# Patient Record
Sex: MALE | Race: BLACK OR AFRICAN AMERICAN | Employment: FULL TIME | URBAN - METROPOLITAN AREA
[De-identification: names, ages, dates, MRNs, and addresses within clinical notes are randomized per-mention and may not be internally consistent; named-entity substitution may affect disease eponyms.]

---

## 2019-04-14 ENCOUNTER — HOSPITAL ENCOUNTER (EMERGENCY)
Age: 29
Discharge: HOME | End: 2019-04-14
Attending: EMERGENCY MEDICINE
Payer: COMMERCIAL

## 2019-04-14 VITALS
RESPIRATION RATE: 16 BRPM | DIASTOLIC BLOOD PRESSURE: 93 MMHG | OXYGEN SATURATION: 99 % | HEART RATE: 82 BPM | HEIGHT: 73 IN | WEIGHT: 206.35 LBS | TEMPERATURE: 97.9 F | SYSTOLIC BLOOD PRESSURE: 137 MMHG | BODY MASS INDEX: 27.35 KG/M2

## 2019-04-14 DIAGNOSIS — S09.90XA CLOSED HEAD INJURY, INITIAL ENCOUNTER: ICD-10-CM

## 2019-04-14 DIAGNOSIS — S01.01XA LACERATION OF SCALP, INITIAL ENCOUNTER: Primary | ICD-10-CM

## 2019-04-14 PROCEDURE — 99283 EMERGENCY DEPT VISIT LOW MDM: CPT

## 2019-04-14 PROCEDURE — 4500000023 HC ED LEVEL 3 PROCEDURE

## 2019-04-14 ASSESSMENT — PAIN DESCRIPTION - ONSET: ONSET: SUDDEN

## 2019-04-14 ASSESSMENT — PAIN DESCRIPTION - FREQUENCY: FREQUENCY: CONTINUOUS

## 2019-04-14 ASSESSMENT — PAIN DESCRIPTION - DESCRIPTORS: DESCRIPTORS: SHARP

## 2019-04-14 ASSESSMENT — PAIN SCALES - GENERAL
PAINLEVEL_OUTOF10: 6
PAINLEVEL_OUTOF10: 0
PAINLEVEL_OUTOF10: 0

## 2019-04-14 ASSESSMENT — PAIN DESCRIPTION - LOCATION: LOCATION: HEAD

## 2019-04-14 ASSESSMENT — PAIN - FUNCTIONAL ASSESSMENT
PAIN_FUNCTIONAL_ASSESSMENT: ACTIVITIES ARE NOT PREVENTED
PAIN_FUNCTIONAL_ASSESSMENT: ACTIVITIES ARE NOT PREVENTED

## 2019-04-14 ASSESSMENT — PAIN DESCRIPTION - PROGRESSION
CLINICAL_PROGRESSION: NOT CHANGED
CLINICAL_PROGRESSION: RESOLVED
CLINICAL_PROGRESSION: RESOLVED

## 2019-04-14 ASSESSMENT — PAIN DESCRIPTION - PAIN TYPE: TYPE: ACUTE PAIN
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
## 2019-04-14 NOTE — ED NOTES
D/C instructions, including follow up care given to pt. Pt verbalized understanding and denies further questions or needs at this time. Ambulatory to waiting room with steady gait. TRINY.         Mirella Don RN  04/14/19 7576

## 2019-04-14 NOTE — DISCHARGE INSTR - COC
{YES / NJ:63672}  Urinary Catheter: {Urinary Catheter:821057218}   Colostomy/Ileostomy/Ileal Conduit: {YES / JI:68385}       Date of Last BM: ***  No intake or output data in the 24 hours ending 19 0813  No intake/output data recorded.     Safety Concerns:     [address] [facility] Safety Concerns:528534641}    Impairments/Disabilities:      [address] [facility] Impairments/Disabilities:533351961}    Nutrition Therapy:  Current Nutrition Therapy:   [address] Gle[facility] Diet List:869667101}    Routes of Feeding: {CHP DME Other Feedings:942671461}  Liquids: {Slp liquid thickness:71508}  Daily Fluid Restriction: {CHP DME Yes amt example:782024100}  Last Modified Barium Swallow with Video (Video Swallowing Test): {Done Not Done VXHY:720044856}    Treatments at the Time of Hospital Discharge:   Respiratory Treatments: ***  Oxygen Therapy:  {Therapy; copd oxygen:93387}  Ventilator:    { CC Vent YJBF:542228185}    Rehab Therapies: {THERAPEUTIC INTERVENTION:1629009545}  Weight Bearing Status/Restrictions: [address] [facility] Weight Bearin}  Other Medical Equipment (for information only, NOT a DME order):  {EQUIPMENT:918500815}  Other Treatments: ***    Patient's personal belongings (please select all that are sent with patient):  {[facility] DME Belongings:913539224}    RN SIGNATURE:  {Esignature:586257378}    CASE MANAGEMENT/SOCIAL WORK SECTION    Inpatient Status Date: ***    Readmission Risk Assessment Score:  Readmission Risk              Risk of Unplanned Readmission:        0           Discharging to Facility/ Agency   · Name:   · Address:  · Phone:  · Fax:    Dialysis Facility (if applicable)   · Name:  · Address:  · Dialysis Schedule:  · Phone:  · Fax:    / signature: {Esignature:823072870}    PHYSICIAN SECTION    Prognosis: {Prognosis:3357945905}    Condition at Discharge: [address] Gleason Patient Condition:637230218}    Rehab Potential (if transferring to Rehab): {Prognosis:4950329873}    Recommended Labs or Other Treatments After Discharge: ***    Physician Certification: I certify the above information and transfer of Sameer Valdivia  is necessary for the continuing treatment of the diagnosis listed and that he requires {Admit to Appropriate Level of Care:73616} for {GREATER/LESS:029022983} 30 days.      Update Admission H&P: {CHP DME Changes in GUFYP:942117484}    PHYSICIAN SIGNATURE:  {Esignature:040225049}

## 2019-04-14 NOTE — ED NOTES
Wound care, localization, and closure completed per Dr. Abad Dawson. Pt tolerated procedure without adverse effect.       Freddie Edgar RN  04/14/19 5685

## 2019-11-30 ENCOUNTER — APPOINTMENT (OUTPATIENT)
Dept: GENERAL RADIOLOGY | Age: 29
End: 2019-11-30
Payer: COMMERCIAL

## 2019-11-30 ENCOUNTER — APPOINTMENT (OUTPATIENT)
Dept: CT IMAGING | Age: 29
End: 2019-11-30
Payer: COMMERCIAL

## 2019-11-30 ENCOUNTER — HOSPITAL ENCOUNTER (EMERGENCY)
Age: 29
Discharge: HOME | End: 2019-11-30
Attending: EMERGENCY MEDICINE
Payer: COMMERCIAL

## 2019-11-30 VITALS
DIASTOLIC BLOOD PRESSURE: 83 MMHG | WEIGHT: 212.3 LBS | TEMPERATURE: 98.2 F | OXYGEN SATURATION: 100 % | RESPIRATION RATE: 15 BRPM | SYSTOLIC BLOOD PRESSURE: 138 MMHG | BODY MASS INDEX: 28.01 KG/M2 | HEART RATE: 67 BPM

## 2019-11-30 DIAGNOSIS — S42.401A CLOSED FRACTURE DISLOCATION OF RIGHT ELBOW, INITIAL ENCOUNTER: Primary | ICD-10-CM

## 2019-11-30 PROCEDURE — 73080 X-RAY EXAM OF ELBOW: CPT

## 2019-11-30 PROCEDURE — 99284 EMERGENCY DEPT VISIT MOD MDM: CPT

## 2019-11-30 PROCEDURE — 73070 X-RAY EXAM OF ELBOW: CPT

## 2019-11-30 PROCEDURE — 6370000000 HC RX 637 (ALT 250 FOR IP): Performed by: NURSE PRACTITIONER

## 2019-11-30 PROCEDURE — 94761 N-INVAS EAR/PLS OXIMETRY MLT: CPT

## 2019-11-30 PROCEDURE — 96374 THER/PROPH/DIAG INJ IV PUSH: CPT

## 2019-11-30 PROCEDURE — 96376 TX/PRO/DX INJ SAME DRUG ADON: CPT

## 2019-11-30 PROCEDURE — 4500000024 HC ED LEVEL 4 PROCEDURE

## 2019-11-30 PROCEDURE — 2500000003 HC RX 250 WO HCPCS: Performed by: NURSE PRACTITIONER

## 2019-11-30 PROCEDURE — 94770 HC ETCO2 MONITOR DAILY: CPT

## 2019-11-30 PROCEDURE — 73200 CT UPPER EXTREMITY W/O DYE: CPT

## 2019-11-30 PROCEDURE — 2580000003 HC RX 258: Performed by: NURSE PRACTITIONER

## 2019-11-30 PROCEDURE — 2700000000 HC OXYGEN THERAPY PER DAY

## 2019-11-30 PROCEDURE — 96375 TX/PRO/DX INJ NEW DRUG ADDON: CPT

## 2019-11-30 PROCEDURE — 6360000002 HC RX W HCPCS: Performed by: NURSE PRACTITIONER

## 2019-11-30 RX ORDER — OXYCODONE HYDROCHLORIDE AND ACETAMINOPHEN 5; 325 MG/1; MG/1
1 TABLET ORAL ONCE
Status: COMPLETED | OUTPATIENT
Start: 2019-11-30 | End: 2019-11-30

## 2019-11-30 RX ORDER — OXYCODONE HYDROCHLORIDE AND ACETAMINOPHEN 5; 325 MG/1; MG/1
1 TABLET ORAL EVERY 6 HOURS PRN
Qty: 15 TABLET | Refills: 0 | Status: SHIPPED | OUTPATIENT
Start: 2019-11-30 | End: 2019-12-04

## 2019-11-30 RX ORDER — IBUPROFEN 800 MG/1
800 TABLET ORAL EVERY 8 HOURS PRN
Qty: 30 TABLET | Refills: 0 | Status: SHIPPED | OUTPATIENT
Start: 2019-11-30 | End: 2020-05-12 | Stop reason: ALTCHOICE

## 2019-11-30 RX ORDER — ETOMIDATE 2 MG/ML
15 INJECTION INTRAVENOUS ONCE
Status: COMPLETED | OUTPATIENT
Start: 2019-11-30 | End: 2019-11-30

## 2019-11-30 RX ORDER — 0.9 % SODIUM CHLORIDE 0.9 %
1000 INTRAVENOUS SOLUTION INTRAVENOUS ONCE
Status: COMPLETED | OUTPATIENT
Start: 2019-11-30 | End: 2019-11-30

## 2019-11-30 RX ORDER — ONDANSETRON 2 MG/ML
4 INJECTION INTRAMUSCULAR; INTRAVENOUS ONCE
Status: COMPLETED | OUTPATIENT
Start: 2019-11-30 | End: 2019-11-30

## 2019-11-30 RX ADMIN — HYDROMORPHONE HYDROCHLORIDE 1 MG: 1 INJECTION, SOLUTION INTRAMUSCULAR; INTRAVENOUS; SUBCUTANEOUS at 02:16

## 2019-11-30 RX ADMIN — ONDANSETRON 4 MG: 2 INJECTION INTRAMUSCULAR; INTRAVENOUS at 03:50

## 2019-11-30 RX ADMIN — HYDROMORPHONE HYDROCHLORIDE 1 MG: 1 INJECTION, SOLUTION INTRAMUSCULAR; INTRAVENOUS; SUBCUTANEOUS at 01:28

## 2019-11-30 RX ADMIN — SODIUM CHLORIDE 1000 ML: 9 INJECTION, SOLUTION INTRAVENOUS at 01:57

## 2019-11-30 RX ADMIN — ETOMIDATE 15 MG: 2 INJECTION INTRAVENOUS at 02:00

## 2019-11-30 RX ADMIN — HYDROMORPHONE HYDROCHLORIDE 1 MG: 1 INJECTION, SOLUTION INTRAMUSCULAR; INTRAVENOUS; SUBCUTANEOUS at 03:21

## 2019-11-30 RX ADMIN — OXYCODONE HYDROCHLORIDE AND ACETAMINOPHEN 1 TABLET: 5; 325 TABLET ORAL at 03:21

## 2019-11-30 ASSESSMENT — PAIN SCALES - GENERAL
PAINLEVEL_OUTOF10: 10
PAINLEVEL_OUTOF10: 6
PAINLEVEL_OUTOF10: 10

## 2019-11-30 ASSESSMENT — ENCOUNTER SYMPTOMS
VOMITING: 0
NAUSEA: 0
ABDOMINAL PAIN: 0
COLOR CHANGE: 0
SHORTNESS OF BREATH: 0
ABDOMINAL DISTENTION: 0
BACK PAIN: 0
FACIAL SWELLING: 0

## 2019-11-30 ASSESSMENT — PAIN - FUNCTIONAL ASSESSMENT: PAIN_FUNCTIONAL_ASSESSMENT: ACTIVITIES ARE NOT PREVENTED

## 2019-11-30 ASSESSMENT — PAIN DESCRIPTION - ORIENTATION: ORIENTATION: RIGHT

## 2019-11-30 ASSESSMENT — PAIN DESCRIPTION - PAIN TYPE: TYPE: ACUTE PAIN

## 2019-11-30 ASSESSMENT — PAIN DESCRIPTION - LOCATION: LOCATION: ELBOW

## 2019-11-30 ASSESSMENT — PAIN DESCRIPTION - DESCRIPTORS: DESCRIPTORS: CONSTANT

## 2019-11-30 ASSESSMENT — PAIN DESCRIPTION - FREQUENCY: FREQUENCY: CONTINUOUS

## 2019-11-30 ASSESSMENT — PAIN DESCRIPTION - ONSET: ONSET: SUDDEN

## 2019-12-02 ENCOUNTER — TELEPHONE (OUTPATIENT)
Dept: ORTHOPEDIC SURGERY | Age: 29
End: 2019-12-02

## 2019-12-04 ENCOUNTER — OFFICE VISIT (OUTPATIENT)
Dept: ORTHOPEDIC SURGERY | Age: 29
End: 2019-12-04
Payer: COMMERCIAL

## 2019-12-04 VITALS — BODY MASS INDEX: 28.71 KG/M2 | HEIGHT: 72 IN | WEIGHT: 212 LBS | RESPIRATION RATE: 16 BRPM

## 2019-12-04 DIAGNOSIS — S53.104A DISLOCATION OF RIGHT ELBOW, INITIAL ENCOUNTER: Primary | ICD-10-CM

## 2019-12-04 DIAGNOSIS — S52.041A DISPLACED FRACTURE OF CORONOID PROCESS OF RIGHT ULNA, INITIAL ENCOUNTER FOR CLOSED FRACTURE: ICD-10-CM

## 2019-12-04 DIAGNOSIS — S52.134A CLOSED NONDISPLACED FRACTURE OF NECK OF RIGHT RADIUS, INITIAL ENCOUNTER: ICD-10-CM

## 2019-12-04 PROCEDURE — 24670 CLTX ULNAR FX PROX W/O MNPJ: CPT | Performed by: ORTHOPAEDIC SURGERY

## 2019-12-04 PROCEDURE — 24650 CLTX RDL HEAD/NCK FX WO MNPJ: CPT | Performed by: ORTHOPAEDIC SURGERY

## 2019-12-04 PROCEDURE — 99203 OFFICE O/P NEW LOW 30 MIN: CPT | Performed by: ORTHOPAEDIC SURGERY

## 2019-12-18 ENCOUNTER — OFFICE VISIT (OUTPATIENT)
Dept: ORTHOPEDIC SURGERY | Age: 29
End: 2019-12-18

## 2019-12-18 VITALS — BODY MASS INDEX: 28.71 KG/M2 | HEIGHT: 72 IN | WEIGHT: 212 LBS

## 2019-12-18 DIAGNOSIS — S52.134A CLOSED NONDISPLACED FRACTURE OF NECK OF RIGHT RADIUS, INITIAL ENCOUNTER: ICD-10-CM

## 2019-12-18 DIAGNOSIS — S52.041A DISPLACED FRACTURE OF CORONOID PROCESS OF RIGHT ULNA, INITIAL ENCOUNTER FOR CLOSED FRACTURE: ICD-10-CM

## 2019-12-18 DIAGNOSIS — S53.104A DISLOCATION OF RIGHT ELBOW, INITIAL ENCOUNTER: Primary | ICD-10-CM

## 2019-12-18 PROCEDURE — 99024 POSTOP FOLLOW-UP VISIT: CPT | Performed by: ORTHOPAEDIC SURGERY

## 2019-12-23 ENCOUNTER — TELEPHONE (OUTPATIENT)
Dept: ORTHOPEDIC SURGERY | Age: 29
End: 2019-12-23

## 2019-12-23 ENCOUNTER — OFFICE VISIT (OUTPATIENT)
Dept: ORTHOPEDIC SURGERY | Age: 29
End: 2019-12-23
Payer: COMMERCIAL

## 2019-12-23 VITALS — BODY MASS INDEX: 28.73 KG/M2 | WEIGHT: 212.08 LBS | HEIGHT: 72 IN

## 2019-12-23 DIAGNOSIS — S53.104A DISLOCATION OF RIGHT ELBOW, INITIAL ENCOUNTER: Primary | ICD-10-CM

## 2019-12-23 DIAGNOSIS — S52.041A DISPLACED FRACTURE OF CORONOID PROCESS OF RIGHT ULNA, INITIAL ENCOUNTER FOR CLOSED FRACTURE: ICD-10-CM

## 2019-12-23 DIAGNOSIS — S52.134A CLOSED NONDISPLACED FRACTURE OF NECK OF RIGHT RADIUS, INITIAL ENCOUNTER: ICD-10-CM

## 2019-12-23 PROCEDURE — 99203 OFFICE O/P NEW LOW 30 MIN: CPT | Performed by: ORTHOPAEDIC SURGERY

## 2019-12-24 ENCOUNTER — ANESTHESIA EVENT (OUTPATIENT)
Dept: OPERATING ROOM | Age: 29
End: 2019-12-24
Payer: COMMERCIAL

## 2019-12-26 ENCOUNTER — HOSPITAL ENCOUNTER (OUTPATIENT)
Age: 29
Setting detail: OUTPATIENT SURGERY
Discharge: HOME | End: 2019-12-26
Attending: ORTHOPAEDIC SURGERY | Admitting: ORTHOPAEDIC SURGERY
Payer: COMMERCIAL

## 2019-12-26 ENCOUNTER — ANESTHESIA (OUTPATIENT)
Dept: OPERATING ROOM | Age: 29
End: 2019-12-26
Payer: COMMERCIAL

## 2019-12-26 ENCOUNTER — APPOINTMENT (OUTPATIENT)
Dept: GENERAL RADIOLOGY | Age: 29
End: 2019-12-26
Attending: ORTHOPAEDIC SURGERY
Payer: COMMERCIAL

## 2019-12-26 VITALS — SYSTOLIC BLOOD PRESSURE: 98 MMHG | DIASTOLIC BLOOD PRESSURE: 50 MMHG | OXYGEN SATURATION: 98 % | TEMPERATURE: 98.6 F

## 2019-12-26 VITALS
WEIGHT: 200 LBS | BODY MASS INDEX: 26.51 KG/M2 | HEART RATE: 91 BPM | HEIGHT: 73 IN | RESPIRATION RATE: 20 BRPM | TEMPERATURE: 97.9 F | DIASTOLIC BLOOD PRESSURE: 82 MMHG | OXYGEN SATURATION: 96 % | SYSTOLIC BLOOD PRESSURE: 135 MMHG

## 2019-12-26 DIAGNOSIS — S53.104A DISLOCATION OF RIGHT ELBOW, INITIAL ENCOUNTER: ICD-10-CM

## 2019-12-26 DIAGNOSIS — S52.041A DISPLACED FRACTURE OF CORONOID PROCESS OF RIGHT ULNA, INITIAL ENCOUNTER FOR CLOSED FRACTURE: Primary | ICD-10-CM

## 2019-12-26 PROCEDURE — 2580000003 HC RX 258: Performed by: ORTHOPAEDIC SURGERY

## 2019-12-26 PROCEDURE — 6360000002 HC RX W HCPCS: Performed by: NURSE ANESTHETIST, CERTIFIED REGISTERED

## 2019-12-26 PROCEDURE — 2720000010 HC SURG SUPPLY STERILE: Performed by: ORTHOPAEDIC SURGERY

## 2019-12-26 PROCEDURE — 64415 NJX AA&/STRD BRCH PLXS IMG: CPT | Performed by: ANESTHESIOLOGY

## 2019-12-26 PROCEDURE — 7100000000 HC PACU RECOVERY - FIRST 15 MIN: Performed by: ORTHOPAEDIC SURGERY

## 2019-12-26 PROCEDURE — 7100000010 HC PHASE II RECOVERY - FIRST 15 MIN: Performed by: ORTHOPAEDIC SURGERY

## 2019-12-26 PROCEDURE — 2500000003 HC RX 250 WO HCPCS: Performed by: ANESTHESIOLOGY

## 2019-12-26 PROCEDURE — 3700000001 HC ADD 15 MINUTES (ANESTHESIA): Performed by: ORTHOPAEDIC SURGERY

## 2019-12-26 PROCEDURE — 3600000004 HC SURGERY LEVEL 4 BASE: Performed by: ORTHOPAEDIC SURGERY

## 2019-12-26 PROCEDURE — 2580000003 HC RX 258: Performed by: ANESTHESIOLOGY

## 2019-12-26 PROCEDURE — 6360000002 HC RX W HCPCS

## 2019-12-26 PROCEDURE — 2500000003 HC RX 250 WO HCPCS: Performed by: NURSE ANESTHETIST, CERTIFIED REGISTERED

## 2019-12-26 PROCEDURE — 6370000000 HC RX 637 (ALT 250 FOR IP): Performed by: ORTHOPAEDIC SURGERY

## 2019-12-26 PROCEDURE — 3209999900 FLUORO FOR SURGICAL PROCEDURES

## 2019-12-26 PROCEDURE — 3600000014 HC SURGERY LEVEL 4 ADDTL 15MIN: Performed by: ORTHOPAEDIC SURGERY

## 2019-12-26 PROCEDURE — 7100000011 HC PHASE II RECOVERY - ADDTL 15 MIN: Performed by: ORTHOPAEDIC SURGERY

## 2019-12-26 PROCEDURE — 6360000002 HC RX W HCPCS: Performed by: ORTHOPAEDIC SURGERY

## 2019-12-26 PROCEDURE — 7100000001 HC PACU RECOVERY - ADDTL 15 MIN: Performed by: ORTHOPAEDIC SURGERY

## 2019-12-26 PROCEDURE — C1713 ANCHOR/SCREW BN/BN,TIS/BN: HCPCS | Performed by: ORTHOPAEDIC SURGERY

## 2019-12-26 PROCEDURE — 2709999900 HC NON-CHARGEABLE SUPPLY: Performed by: ORTHOPAEDIC SURGERY

## 2019-12-26 PROCEDURE — 3700000000 HC ANESTHESIA ATTENDED CARE: Performed by: ORTHOPAEDIC SURGERY

## 2019-12-26 PROCEDURE — 6360000002 HC RX W HCPCS: Performed by: ANESTHESIOLOGY

## 2019-12-26 PROCEDURE — 73070 X-RAY EXAM OF ELBOW: CPT

## 2019-12-26 DEVICE — PIN FIX L45MM DIA2.5MM ELBW CO CHROM AXIS FOR STBL SYS IJS: Type: IMPLANTABLE DEVICE | Site: ELBOW | Status: FUNCTIONAL

## 2019-12-26 DEVICE — SCREW BNE L16MM DIA3.5MM CORT DST RAD VOLAR TI NONLOCKING: Type: IMPLANTABLE DEVICE | Site: ELBOW | Status: FUNCTIONAL

## 2019-12-26 DEVICE — IMPLANTABLE DEVICE: Type: IMPLANTABLE DEVICE | Site: ELBOW | Status: FUNCTIONAL

## 2019-12-26 DEVICE — SCREW BNE L18MM DIA3.5MM CORT DST RAD VOLAR TI NONLOCKING: Type: IMPLANTABLE DEVICE | Site: ELBOW | Status: FUNCTIONAL

## 2019-12-26 DEVICE — BASEPLATE BNE EL TI ASSEMB FOR STBL SYS IJS: Type: IMPLANTABLE DEVICE | Site: ELBOW | Status: FUNCTIONAL

## 2019-12-26 DEVICE — SCREW BNE L40MM DIA3.5MM CORT S STL ST NONCANNULATED LOK: Type: IMPLANTABLE DEVICE | Site: ELBOW | Status: FUNCTIONAL

## 2019-12-26 RX ORDER — OXYCODONE HYDROCHLORIDE AND ACETAMINOPHEN 5; 325 MG/1; MG/1
1 TABLET ORAL PRN
Status: DISCONTINUED | OUTPATIENT
Start: 2019-12-26 | End: 2019-12-26 | Stop reason: HOSPADM

## 2019-12-26 RX ORDER — ONDANSETRON 2 MG/ML
INJECTION INTRAMUSCULAR; INTRAVENOUS PRN
Status: DISCONTINUED | OUTPATIENT
Start: 2019-12-26 | End: 2019-12-26 | Stop reason: SDUPTHER

## 2019-12-26 RX ORDER — ENALAPRILAT 2.5 MG/2ML
1.25 INJECTION INTRAVENOUS
Status: DISCONTINUED | OUTPATIENT
Start: 2019-12-26 | End: 2019-12-26 | Stop reason: HOSPADM

## 2019-12-26 RX ORDER — ONDANSETRON 2 MG/ML
4 INJECTION INTRAMUSCULAR; INTRAVENOUS ONCE
Status: COMPLETED | OUTPATIENT
Start: 2019-12-26 | End: 2019-12-26

## 2019-12-26 RX ORDER — SODIUM CHLORIDE, SODIUM LACTATE, POTASSIUM CHLORIDE, CALCIUM CHLORIDE 600; 310; 30; 20 MG/100ML; MG/100ML; MG/100ML; MG/100ML
INJECTION, SOLUTION INTRAVENOUS CONTINUOUS
Status: DISCONTINUED | OUTPATIENT
Start: 2019-12-26 | End: 2019-12-26 | Stop reason: HOSPADM

## 2019-12-26 RX ORDER — LABETALOL 20 MG/4 ML (5 MG/ML) INTRAVENOUS SYRINGE
5 EVERY 10 MIN PRN
Status: DISCONTINUED | OUTPATIENT
Start: 2019-12-26 | End: 2019-12-26 | Stop reason: HOSPADM

## 2019-12-26 RX ORDER — HYDRALAZINE HYDROCHLORIDE 20 MG/ML
5 INJECTION INTRAMUSCULAR; INTRAVENOUS EVERY 5 MIN PRN
Status: DISCONTINUED | OUTPATIENT
Start: 2019-12-26 | End: 2019-12-26 | Stop reason: HOSPADM

## 2019-12-26 RX ORDER — LIDOCAINE HYDROCHLORIDE 10 MG/ML
1 INJECTION, SOLUTION EPIDURAL; INFILTRATION; INTRACAUDAL; PERINEURAL
Status: DISCONTINUED | OUTPATIENT
Start: 2019-12-26 | End: 2019-12-26 | Stop reason: HOSPADM

## 2019-12-26 RX ORDER — MIDAZOLAM HYDROCHLORIDE 1 MG/ML
2 INJECTION INTRAMUSCULAR; INTRAVENOUS
Status: COMPLETED | OUTPATIENT
Start: 2019-12-26 | End: 2019-12-26

## 2019-12-26 RX ORDER — FENTANYL CITRATE 50 UG/ML
50 INJECTION, SOLUTION INTRAMUSCULAR; INTRAVENOUS EVERY 5 MIN PRN
Status: DISCONTINUED | OUTPATIENT
Start: 2019-12-26 | End: 2019-12-26 | Stop reason: HOSPADM

## 2019-12-26 RX ORDER — DEXAMETHASONE SODIUM PHOSPHATE 4 MG/ML
INJECTION, SOLUTION INTRA-ARTICULAR; INTRALESIONAL; INTRAMUSCULAR; INTRAVENOUS; SOFT TISSUE
Status: COMPLETED
Start: 2019-12-26 | End: 2019-12-26

## 2019-12-26 RX ORDER — SODIUM CHLORIDE 0.9 % (FLUSH) 0.9 %
10 SYRINGE (ML) INJECTION EVERY 12 HOURS SCHEDULED
Status: DISCONTINUED | OUTPATIENT
Start: 2019-12-26 | End: 2019-12-26 | Stop reason: HOSPADM

## 2019-12-26 RX ORDER — OXYCODONE HYDROCHLORIDE AND ACETAMINOPHEN 5; 325 MG/1; MG/1
1 TABLET ORAL EVERY 6 HOURS PRN
Qty: 20 TABLET | Refills: 0 | Status: SHIPPED | OUTPATIENT
Start: 2019-12-26 | End: 2019-12-31

## 2019-12-26 RX ORDER — DEXAMETHASONE SODIUM PHOSPHATE 4 MG/ML
4 INJECTION, SOLUTION INTRA-ARTICULAR; INTRALESIONAL; INTRAMUSCULAR; INTRAVENOUS; SOFT TISSUE ONCE
Status: COMPLETED | OUTPATIENT
Start: 2019-12-26 | End: 2019-12-26

## 2019-12-26 RX ORDER — FENTANYL CITRATE 50 UG/ML
100 INJECTION, SOLUTION INTRAMUSCULAR; INTRAVENOUS ONCE
Status: COMPLETED | OUTPATIENT
Start: 2019-12-26 | End: 2019-12-26

## 2019-12-26 RX ORDER — GLYCOPYRROLATE 1 MG/5 ML
SYRINGE (ML) INTRAVENOUS PRN
Status: DISCONTINUED | OUTPATIENT
Start: 2019-12-26 | End: 2019-12-26 | Stop reason: SDUPTHER

## 2019-12-26 RX ORDER — NEOSTIGMINE METHYLSULFATE 5 MG/5 ML
SYRINGE (ML) INTRAVENOUS PRN
Status: DISCONTINUED | OUTPATIENT
Start: 2019-12-26 | End: 2019-12-26 | Stop reason: SDUPTHER

## 2019-12-26 RX ORDER — GLYCOPYRROLATE 0.2 MG/ML
0.2 INJECTION INTRAMUSCULAR; INTRAVENOUS ONCE
Status: COMPLETED | OUTPATIENT
Start: 2019-12-26 | End: 2019-12-26

## 2019-12-26 RX ORDER — ROPIVACAINE HYDROCHLORIDE 5 MG/ML
INJECTION, SOLUTION EPIDURAL; INFILTRATION; PERINEURAL PRN
Status: DISCONTINUED | OUTPATIENT
Start: 2019-12-26 | End: 2019-12-26 | Stop reason: SDUPTHER

## 2019-12-26 RX ORDER — SODIUM CHLORIDE 0.9 % (FLUSH) 0.9 %
10 SYRINGE (ML) INJECTION PRN
Status: DISCONTINUED | OUTPATIENT
Start: 2019-12-26 | End: 2019-12-26 | Stop reason: HOSPADM

## 2019-12-26 RX ORDER — DEXAMETHASONE SODIUM PHOSPHATE 4 MG/ML
INJECTION, SOLUTION INTRA-ARTICULAR; INTRALESIONAL; INTRAMUSCULAR; INTRAVENOUS; SOFT TISSUE PRN
Status: DISCONTINUED | OUTPATIENT
Start: 2019-12-26 | End: 2019-12-26 | Stop reason: SDUPTHER

## 2019-12-26 RX ORDER — ONDANSETRON 2 MG/ML
4 INJECTION INTRAMUSCULAR; INTRAVENOUS
Status: COMPLETED | OUTPATIENT
Start: 2019-12-26 | End: 2019-12-26

## 2019-12-26 RX ORDER — ROPIVACAINE HYDROCHLORIDE 5 MG/ML
INJECTION, SOLUTION EPIDURAL; INFILTRATION; PERINEURAL
Status: COMPLETED
Start: 2019-12-26 | End: 2019-12-26

## 2019-12-26 RX ORDER — PROPOFOL 10 MG/ML
INJECTION, EMULSION INTRAVENOUS PRN
Status: DISCONTINUED | OUTPATIENT
Start: 2019-12-26 | End: 2019-12-26 | Stop reason: SDUPTHER

## 2019-12-26 RX ORDER — GINSENG 100 MG
CAPSULE ORAL PRN
Status: DISCONTINUED | OUTPATIENT
Start: 2019-12-26 | End: 2019-12-26 | Stop reason: ALTCHOICE

## 2019-12-26 RX ORDER — LIDOCAINE HYDROCHLORIDE 20 MG/ML
INJECTION, SOLUTION INTRAVENOUS PRN
Status: DISCONTINUED | OUTPATIENT
Start: 2019-12-26 | End: 2019-12-26 | Stop reason: SDUPTHER

## 2019-12-26 RX ORDER — FENTANYL CITRATE 50 UG/ML
25 INJECTION, SOLUTION INTRAMUSCULAR; INTRAVENOUS EVERY 5 MIN PRN
Status: DISCONTINUED | OUTPATIENT
Start: 2019-12-26 | End: 2019-12-26 | Stop reason: HOSPADM

## 2019-12-26 RX ORDER — ROCURONIUM BROMIDE 10 MG/ML
INJECTION, SOLUTION INTRAVENOUS PRN
Status: DISCONTINUED | OUTPATIENT
Start: 2019-12-26 | End: 2019-12-26 | Stop reason: SDUPTHER

## 2019-12-26 RX ORDER — MAGNESIUM HYDROXIDE 1200 MG/15ML
LIQUID ORAL CONTINUOUS PRN
Status: COMPLETED | OUTPATIENT
Start: 2019-12-26 | End: 2019-12-26

## 2019-12-26 RX ORDER — ESMOLOL HYDROCHLORIDE 10 MG/ML
INJECTION INTRAVENOUS PRN
Status: DISCONTINUED | OUTPATIENT
Start: 2019-12-26 | End: 2019-12-26 | Stop reason: SDUPTHER

## 2019-12-26 RX ADMIN — PROPOFOL 70 MG: 10 INJECTION, EMULSION INTRAVENOUS at 12:06

## 2019-12-26 RX ADMIN — SODIUM CHLORIDE, SODIUM LACTATE, POTASSIUM CHLORIDE, AND CALCIUM CHLORIDE: 600; 310; 30; 20 INJECTION, SOLUTION INTRAVENOUS at 06:32

## 2019-12-26 RX ADMIN — SODIUM CHLORIDE, SODIUM LACTATE, POTASSIUM CHLORIDE, AND CALCIUM CHLORIDE: 600; 310; 30; 20 INJECTION, SOLUTION INTRAVENOUS at 07:39

## 2019-12-26 RX ADMIN — DEXAMETHASONE SODIUM PHOSPHATE 4 MG: 4 INJECTION, SOLUTION INTRAMUSCULAR; INTRAVENOUS at 06:53

## 2019-12-26 RX ADMIN — ONDANSETRON 4 MG: 2 INJECTION INTRAMUSCULAR; INTRAVENOUS at 11:54

## 2019-12-26 RX ADMIN — LIDOCAINE HYDROCHLORIDE 100 MG: 20 INJECTION, SOLUTION INTRAVENOUS at 07:47

## 2019-12-26 RX ADMIN — DEXAMETHASONE SODIUM PHOSPHATE 4 MG: 4 INJECTION, SOLUTION INTRAMUSCULAR; INTRAVENOUS at 07:10

## 2019-12-26 RX ADMIN — ONDANSETRON 4 MG: 2 INJECTION INTRAMUSCULAR; INTRAVENOUS at 08:00

## 2019-12-26 RX ADMIN — GLYCOPYRROLATE 0.2 MG: 0.2 INJECTION, SOLUTION INTRAMUSCULAR; INTRAVENOUS at 06:54

## 2019-12-26 RX ADMIN — DEXAMETHASONE SODIUM PHOSPHATE 8 MG: 4 INJECTION, SOLUTION INTRAMUSCULAR; INTRAVENOUS at 08:00

## 2019-12-26 RX ADMIN — MIDAZOLAM 2 MG: 1 INJECTION INTRAMUSCULAR; INTRAVENOUS at 06:59

## 2019-12-26 RX ADMIN — Medication 2 G: at 07:56

## 2019-12-26 RX ADMIN — SODIUM CHLORIDE, SODIUM LACTATE, POTASSIUM CHLORIDE, AND CALCIUM CHLORIDE: 600; 310; 30; 20 INJECTION, SOLUTION INTRAVENOUS at 11:32

## 2019-12-26 RX ADMIN — ESMOLOL HYDROCHLORIDE 50 MG: 10 INJECTION, SOLUTION INTRAVENOUS at 07:48

## 2019-12-26 RX ADMIN — FENTANYL CITRATE 100 MCG: 50 INJECTION INTRAMUSCULAR; INTRAVENOUS at 06:59

## 2019-12-26 RX ADMIN — Medication 0.4 MG: at 12:09

## 2019-12-26 RX ADMIN — PROPOFOL 300 MG: 10 INJECTION, EMULSION INTRAVENOUS at 07:48

## 2019-12-26 RX ADMIN — Medication 1 MG: at 08:00

## 2019-12-26 RX ADMIN — Medication 3 MG: at 12:09

## 2019-12-26 RX ADMIN — ROCURONIUM BROMIDE 100 MG: 10 INJECTION, SOLUTION INTRAVENOUS at 07:48

## 2019-12-26 RX ADMIN — ONDANSETRON 4 MG: 2 INJECTION INTRAMUSCULAR; INTRAVENOUS at 13:49

## 2019-12-26 RX ADMIN — ROPIVACAINE HYDROCHLORIDE 40 ML: 5 INJECTION, SOLUTION EPIDURAL; INFILTRATION; PERINEURAL at 07:10

## 2019-12-26 RX ADMIN — ONDANSETRON 4 MG: 2 INJECTION INTRAMUSCULAR; INTRAVENOUS at 06:54

## 2019-12-26 RX ADMIN — Medication 1 G: at 11:54

## 2019-12-26 RX ADMIN — DEXAMETHASONE SODIUM PHOSPHATE 4 MG: 4 INJECTION, SOLUTION INTRA-ARTICULAR; INTRALESIONAL; INTRAMUSCULAR; INTRAVENOUS; SOFT TISSUE at 06:53

## 2019-12-26 ASSESSMENT — PULMONARY FUNCTION TESTS
PIF_VALUE: 19
PIF_VALUE: 2
PIF_VALUE: 17
PIF_VALUE: 3
PIF_VALUE: 19
PIF_VALUE: 19
PIF_VALUE: 18
PIF_VALUE: 19
PIF_VALUE: 4
PIF_VALUE: 19
PIF_VALUE: 17
PIF_VALUE: 19
PIF_VALUE: 17
PIF_VALUE: 19
PIF_VALUE: 18
PIF_VALUE: 18
PIF_VALUE: 19
PIF_VALUE: 18
PIF_VALUE: 19
PIF_VALUE: 3
PIF_VALUE: 3
PIF_VALUE: 19
PIF_VALUE: 0
PIF_VALUE: 3
PIF_VALUE: 3
PIF_VALUE: 19
PIF_VALUE: 2
PIF_VALUE: 19
PIF_VALUE: 4
PIF_VALUE: 18
PIF_VALUE: 19
PIF_VALUE: 18
PIF_VALUE: 19
PIF_VALUE: 2
PIF_VALUE: 19
PIF_VALUE: 18
PIF_VALUE: 18
PIF_VALUE: 19
PIF_VALUE: 3
PIF_VALUE: 19
PIF_VALUE: 2
PIF_VALUE: 18
PIF_VALUE: 19
PIF_VALUE: 17
PIF_VALUE: 19
PIF_VALUE: 17
PIF_VALUE: 19
PIF_VALUE: 18
PIF_VALUE: 19
PIF_VALUE: 19
PIF_VALUE: 17
PIF_VALUE: 19
PIF_VALUE: 3
PIF_VALUE: 19
PIF_VALUE: 19
PIF_VALUE: 18
PIF_VALUE: 19
PIF_VALUE: 13
PIF_VALUE: 19
PIF_VALUE: 18
PIF_VALUE: 18
PIF_VALUE: 19
PIF_VALUE: 19
PIF_VALUE: 18
PIF_VALUE: 19
PIF_VALUE: 1
PIF_VALUE: 19
PIF_VALUE: 13
PIF_VALUE: 19
PIF_VALUE: 18
PIF_VALUE: 19
PIF_VALUE: 19
PIF_VALUE: 18
PIF_VALUE: 19
PIF_VALUE: 0
PIF_VALUE: 19
PIF_VALUE: 3
PIF_VALUE: 19
PIF_VALUE: 1
PIF_VALUE: 19
PIF_VALUE: 0
PIF_VALUE: 19
PIF_VALUE: 18
PIF_VALUE: 19
PIF_VALUE: 0
PIF_VALUE: 19
PIF_VALUE: 1
PIF_VALUE: 19
PIF_VALUE: 14
PIF_VALUE: 19
PIF_VALUE: 13
PIF_VALUE: 19
PIF_VALUE: 1
PIF_VALUE: 19
PIF_VALUE: 16
PIF_VALUE: 19
PIF_VALUE: 18
PIF_VALUE: 19
PIF_VALUE: 17
PIF_VALUE: 19
PIF_VALUE: 18
PIF_VALUE: 18
PIF_VALUE: 19
PIF_VALUE: 18
PIF_VALUE: 19
PIF_VALUE: 18

## 2019-12-26 ASSESSMENT — PAIN SCALES - GENERAL
PAINLEVEL_OUTOF10: 4
PAINLEVEL_OUTOF10: 0

## 2019-12-26 ASSESSMENT — PAIN DESCRIPTION - ORIENTATION: ORIENTATION: RIGHT

## 2019-12-26 ASSESSMENT — PAIN DESCRIPTION - DESCRIPTORS: DESCRIPTORS: SHARP

## 2019-12-26 ASSESSMENT — PAIN DESCRIPTION - LOCATION: LOCATION: ELBOW

## 2019-12-26 ASSESSMENT — PAIN - FUNCTIONAL ASSESSMENT: PAIN_FUNCTIONAL_ASSESSMENT: 0-10

## 2019-12-27 ENCOUNTER — TELEPHONE (OUTPATIENT)
Dept: ORTHOPEDIC SURGERY | Age: 29
End: 2019-12-27

## 2020-01-06 ENCOUNTER — HOSPITAL ENCOUNTER (OUTPATIENT)
Dept: OCCUPATIONAL THERAPY | Age: 30
Setting detail: THERAPIES SERIES
End: 2020-01-06
Payer: COMMERCIAL

## 2020-01-06 ENCOUNTER — TELEPHONE (OUTPATIENT)
Dept: ORTHOPEDIC SURGERY | Age: 30
End: 2020-01-06

## 2020-01-06 ENCOUNTER — OFFICE VISIT (OUTPATIENT)
Dept: ORTHOPEDIC SURGERY | Age: 30
End: 2020-01-06

## 2020-01-06 PROCEDURE — 99024 POSTOP FOLLOW-UP VISIT: CPT | Performed by: ORTHOPAEDIC SURGERY

## 2020-01-06 PROCEDURE — L3760 EO ADJ JT PREFAB CUSTOM FIT: HCPCS | Performed by: ORTHOPAEDIC SURGERY

## 2020-01-06 NOTE — TELEPHONE ENCOUNTER
1/6/20 [facility]     -  NO PRECERT REQUIRED - PER Funmilayo Means   REF # [id number]  MP

## 2020-01-09 ENCOUNTER — HOSPITAL ENCOUNTER (OUTPATIENT)
Dept: OCCUPATIONAL THERAPY | Age: 30
Setting detail: THERAPIES SERIES
Discharge: HOME | End: 2020-01-09
Payer: COMMERCIAL

## 2020-01-09 PROCEDURE — 97165 OT EVAL LOW COMPLEX 30 MIN: CPT | Performed by: OCCUPATIONAL THERAPIST

## 2020-01-09 PROCEDURE — 97112 NEUROMUSCULAR REEDUCATION: CPT | Performed by: OCCUPATIONAL THERAPIST

## 2020-01-09 PROCEDURE — 97110 THERAPEUTIC EXERCISES: CPT | Performed by: OCCUPATIONAL THERAPIST

## 2020-01-09 NOTE — PLAN OF CARE
potential:  []Age    []Lack of Motivation   []Co-Morbidities  []Cognitive Function  []Environmental/home/work barriers  []Other: Tolerance of evaluation/treatment:    [] Excellent [x] Good [] Fair  [] Poor    PLAN OF CARE:  Interventions:   [x] Therapeutic Exercise [x] Therapeutic Activity    [x] Activities of Daily Living [x] Neuromuscular Re-education      [x] Patient Education  [x] Manual Therapy      [x] Modalities as needed, and not otherwise contraindicated, including: ultrasound,paraffin,moist heat/cold pack, electrical stimulation, contrast bath, iontophoresis  [x] Splinting    Frequency/Duration:  1-2 days per week for 8-12 weeks      GOALS:  Patient stated goal: straighten arm    [] Progressing: [] Met: [] Not Met: [] Adjusted    Therapist goals for Patient:   Short Term Goals: To be achieved in: 2 weeks  1. Independent in HEP and progression per patient tolerance, in order to prevent re-injury. [] Progressing: [] Met: [] Not Met: [] Adjusted   2. Patient will have a decrease in pain to facilitate improvement in movement, function, and ADLs as indicated by Functional Deficits. [] Progressing: [] Met: [] Not Met: [] Adjusted    Long Term Goals to be achieved in 8-12 weeks (through 4/2/20), including patient directed goals to address patient identified performance deficits:  1) Pt to be independent in graded HEP progression with a good level of effort and compliance. [] Progressing: [] Met: [] Not Met: [] Adjusted   2) Pt to report a score of </= 20 % on the Quick DASH disability questionnaire for increased performance with carrying, moving, and handling objects. [] Progressing: [] Met: [] Not Met: [] Adjusted   3) Pt will demonstrate increased ROM to R elbow SALAZAR >/= 100 deg for improved independence with reaching into cabinets.   [] Progressing: [] Met: [] Not Met: [] Adjusted   4) Pt will demonstrate increased strength to R /pinch >/- 70% of L, MMT R elbow >/= 4+/5  for improved independence present that affect occupational performance. Significant task modifications or assistance needed to complete assessment. Evaluation Code:  [x] Low Complexity EVAL 98593 (typically 30 minutes face to face)  [] Mod Complexity EVAL 50606 (typically 45 minutes face to face)  [] High Complexity EVAL 93834 (typically 60 minutes face to face)    Electronically signed by:   Campbell Lord OTR/L, PT, MPT, [facility], [id number], [id number]

## 2020-01-09 NOTE — FLOWSHEET NOTE
[facility]  [address], [address] [address], [address]  Phone: [phone] Fax: [phone]    Occupational Therapy Treatment Note/ Progress Report:     Date:  2020    Patient Name:  Dillan Damon    :  1990  MRN: [id number]    Medical/Treatment Diagnosis Information:  · Diagnosis: S53.104A (ICD-10-CM) - Dislocation of right elbow, S52.134A (ICD-10-CM) - Closed nondisplaced fracture of neck of right radius, S52.041A (ICD-10-CM) - Displaced fracture of coronoid process of right ulna   · Treatment Diagnosis: R elbow stiffness - O62.591     Insurance/Certification information:  OT Insurance Information: Gloria  Physician Information:  Referring Practitioner: Gerry Echols MD  Has the plan of care been signed (Y/N):        []  Yes  [x]  No       Visit # Insurance Allowable Auth Required   1 75 combined OT/PT []  Yes [x]  No        Is this a Progress Report:     []  Yes  [x]  No      If Yes:  Date Range for reporting period:  Beginning  Ending    Progress report will be due (10 Rx or 30 days whichever is less): 22     Recertification will be due (POC Duration  / 90 days whichever is less): 20   Date of Injury: 19  Date of Surgery: 19 Operative fixation of right subacute coronoid fracture with placement of right internal joint stabilizer elbow     Date of Patient follow up with Physician: 19     RESTRICTIONS/PRECAUTIONS: lateral elbow instability     Latex Allergy:  [x]? No      []? Yes                    Pacemaker:  [x]? No       []? Yes      Preferred Language for Healthcare:   [x]? English       []? other:      Functional Scale: 27% (Quick DASH)                                 Date assessed:  2020     SUBJECTIVE: Patient reported deficits/history of current problem: dislocated arm when fell after trying to prevent a child from falling at the playground on 19; immobilized in long arm cast/splint x several lifting/carrying duties at work. []? Progressing: []? Met: []? Not Met: []? Adjusted   5) Pt will have a decrease in pain to 0-1/10 to facilitate comfort and independence with carrying daughter. []? Progressing: []? Met: []? Not Met: []? Adjusted      Overall Progression Towards Functional Goals/Treatment Progress Update:  [] Patient is progressing as expected towards functional goals listed. [] Progression is slowed due to complexities/impairments listed. [] Progression has been slowed due to co-morbidities. [x] Plan just implemented, too soon to assess goals progression <30 days  [] Goals require adjustment due to lack of progress  [] Patient is not progressing as expected and requires additional follow up with physician  [] All goals are met  [] Other:     Prognosis for POC: [x] Good [] Fair  [] Poor    Patient requires continued skilled intervention: [x] Yes  [] No    Treatment/Activity Tolerance:  [x] Patient able to complete treatment  [] Patient limited by fatigue  [] Patient limited by pain    [] Patient limited by other medical complications  [] Other:                  PLAN: See eval  [] Continue per plan of care [] Alter current plan (see comments above)  [x] Plan of care initiated [] Hold pending MD visit [] Discharge      Electronically signed by: Hanh Red OTR/L, PT, MPT, CHT, [id number], [id number]      Note: If patient does not return for scheduled/ recommended follow up visits, this note will serve as a discharge from care along with most recent update on progress.

## 2020-01-13 ENCOUNTER — HOSPITAL ENCOUNTER (OUTPATIENT)
Dept: OCCUPATIONAL THERAPY | Age: 30
Setting detail: THERAPIES SERIES
Discharge: HOME | End: 2020-01-13
Payer: COMMERCIAL

## 2020-01-13 NOTE — FLOWSHEET NOTE
The [facility]    Occupational Therapy  Cancellation/No-show Note  Patient Name:  Louie Rey   :  1990   Date:  2020  Cancelled visits to date: 0  No-shows to date: 1    For today's appointment patient:  []    Cancelled  []    Rescheduled appointment  [x]    No-show     Reason given by patient:  []    Patient ill  []    Conflicting appointment  []    No transportation    []    Conflict with work  []    No reason given  []    Other:     Comments:      Electronically signed by:   [address] OTR/L, [address], -[address], [id number]

## 2020-01-20 ENCOUNTER — HOSPITAL ENCOUNTER (OUTPATIENT)
Dept: OCCUPATIONAL THERAPY | Age: 30
Setting detail: THERAPIES SERIES
Discharge: HOME | End: 2020-01-20
Payer: COMMERCIAL

## 2020-01-20 ENCOUNTER — APPOINTMENT (OUTPATIENT)
Dept: OCCUPATIONAL THERAPY | Age: 30
End: 2020-01-20
Payer: COMMERCIAL

## 2020-01-20 PROCEDURE — 97140 MANUAL THERAPY 1/> REGIONS: CPT

## 2020-01-20 PROCEDURE — 97530 THERAPEUTIC ACTIVITIES: CPT

## 2020-01-20 PROCEDURE — 97110 THERAPEUTIC EXERCISES: CPT

## 2020-01-20 NOTE — FLOWSHEET NOTE
Reviewed/Progressed HEP activities related to improving balance, coordination, kinesthetic sense, posture, motor skill, proprioception of scapular, scapulothoracic and UE control with self care, reaching, carrying, lifting, house/yardwork, driving/computer work      Manual Treatments:  PROM / STM / Oscillations-Mobs:  G-I, II, III, IV (PA's, Inf., Post.)  [x] (83757) Provided manual therapy to mobilize soft tissue/joints of cervical/CT, scapular GHJ and UE for the purpose of modulating pain, promoting relaxation,  increasing ROM, reducing/eliminating soft tissue swelling/inflammation/restriction, improving soft tissue extensibility and allowing for proper ROM for normal function with self care, reaching, carrying, lifting, house/yardwork, driving/computer work    ADL Training:  [x] (05634) Provided self-care/home management training related to activities of daily living and compensatory training, and/or use of adaptive equipment      Charges:  Timed Code Treatment Minutes: 60   Total Treatment Minutes: 60   Worker's Comp: Time In/Time Out     [] EVAL (LOW) 49100 (typically 20 minutes face-to-face)    [] EVAL (MOD) 46114 (typically 30 minutes face-to-face)  [] EVAL (HIGH) 60522 (typically 45 minutes face-to-face)  [] OT Re-eval (01350)       [x] Minor [phone] x 2      [] CWMWM(14509)  [] NMR (32951) x      [] Estim (attended) (11941)   [x] Manual ([phone]) x 1     [] US (91432)  [x] TA (20269) x 1     [] Paraffin (72251)  [] ADL  (24824) x     [] Splint/L code:    [] Estim (unattended) (05522)  [] Fluidotherapy (56976)  [] Other:      ASSESSMENT:  Min increase AROM but decrease pain, progressing slowly       GOALS:  Patient stated goal: straighten arm    []? Progressing: []? Met: []? Not Met: []? Adjusted     Therapist goals for Patient:   Short Term Goals: To be achieved in: 2 weeks  1. Independent in HEP and progression per patient tolerance, in order to prevent re-injury. [x]? Progressing: []? Met: []?  Not Met: []? Adjusted   2. Patient will have a decrease in pain to facilitate improvement in movement, function, and ADLs as indicated by Functional Deficits. [x]? Progressing: []? Met: []? Not Met: []? Adjusted     Long Term Goals to be achieved in 8-12 weeks (through 4/2/20), including patient directed goals to address patient identified performance deficits:  1) Pt to be independent in graded HEP progression with a good level of effort and compliance. [x]? Progressing: []? Met: []? Not Met: []? Adjusted   2) Pt to report a score of </= 20 % on the Quick DASH disability questionnaire for increased performance with carrying, moving, and handling objects. []? Progressing: []? Met: []? Not Met: []? Adjusted   3) Pt will demonstrate increased ROM to R elbow SALAZAR >/= 100 deg for improved independence with reaching into cabinets. [x]? Progressing: []? Met: []? Not Met: []? Adjusted   4) Pt will demonstrate increased strength to R /pinch >/- 70% of L, MMT R elbow >/= 4+/5  for improved independence with lifting/carrying duties at work. []? Progressing: []? Met: []? Not Met: []? Adjusted   5) Pt will have a decrease in pain to 0-1/10 to facilitate comfort and independence with carrying daughter. []? Progressing: []? Met: []? Not Met: []? Adjusted      Overall Progression Towards Functional Goals/Treatment Progress Update:  [x] Patient is progressing as expected towards functional goals listed. [] Progression is slowed due to complexities/impairments listed. [] Progression has been slowed due to co-morbidities.   [] Plan just implemented, too soon to assess goals progression <30 days  [] Goals require adjustment due to lack of progress  [] Patient is not progressing as expected and requires additional follow up with physician  [] All goals are met  [] Other:     Prognosis for POC: [x] Good [] Fair  [] Poor    Patient requires continued skilled intervention: [x] Yes  [] No    Treatment/Activity Tolerance:  [x] Patient

## 2020-01-22 ENCOUNTER — APPOINTMENT (OUTPATIENT)
Dept: OCCUPATIONAL THERAPY | Age: 30
End: 2020-01-22
Payer: COMMERCIAL

## 2020-01-27 ENCOUNTER — OFFICE VISIT (OUTPATIENT)
Dept: ORTHOPEDIC SURGERY | Age: 30
End: 2020-01-27

## 2020-01-27 ENCOUNTER — HOSPITAL ENCOUNTER (OUTPATIENT)
Dept: OCCUPATIONAL THERAPY | Age: 30
Setting detail: THERAPIES SERIES
Discharge: HOME | End: 2020-01-27
Payer: COMMERCIAL

## 2020-01-27 VITALS — WEIGHT: 199.96 LBS | HEIGHT: 73 IN | BODY MASS INDEX: 26.5 KG/M2

## 2020-01-27 PROCEDURE — 99024 POSTOP FOLLOW-UP VISIT: CPT | Performed by: ORTHOPAEDIC SURGERY

## 2020-01-27 PROCEDURE — 97110 THERAPEUTIC EXERCISES: CPT | Performed by: OCCUPATIONAL THERAPIST

## 2020-01-27 PROCEDURE — 97112 NEUROMUSCULAR REEDUCATION: CPT | Performed by: OCCUPATIONAL THERAPIST

## 2020-01-27 PROCEDURE — 97530 THERAPEUTIC ACTIVITIES: CPT | Performed by: OCCUPATIONAL THERAPIST

## 2020-01-27 PROCEDURE — G0283 ELEC STIM OTHER THAN WOUND: HCPCS | Performed by: OCCUPATIONAL THERAPIST

## 2020-01-27 NOTE — PROGRESS NOTES
and radiocapitellar joint. Healed radial neck fracture. No change in coronoid position and no evidence of joint subluxation with internal joint stabilizer device in place    Orders Placed This Encounter   Procedures    XR ELBOW RIGHT (MIN 3 VIEWS)       Impression:  Encounter Diagnoses   Name Primary?  Closed nondisplaced fracture of neck of right radius, initial encounter Yes    Dislocation of right elbow, initial encounter          Treatment Plan:  I reviewed images and discussed with patient today. It appears that his elbow is stable with internal joint stabilizer and I think that he can continue to progress his elbow motion. We discussed certainly the concern for stiffness in his elbow secondary to him being immobilized prior to his most recent joint stabilizing procedure. I do think that adding passive range of motion as tolerated at this point would be beneficial for him in addition to active and active assist range of motion with no specific limitations with for his range of motion.   I did discuss with him removing his hinged elbow brace at home particularly for light daily activities but continue to wear over the next several weeks when out and about for protection  I would like him to continue to work with therapy as much as possible and I certainly understand some of the limitations that he has and therefore he can focus and transition to home exercise program as much as possible as well  I will plan to continue to monitor his progress closely and we will see him back in approximately 1 month for repeat evaluation

## 2020-02-03 ENCOUNTER — HOSPITAL ENCOUNTER (OUTPATIENT)
Dept: OCCUPATIONAL THERAPY | Age: 30
Setting detail: THERAPIES SERIES
Discharge: HOME | End: 2020-02-03
Payer: COMMERCIAL

## 2020-02-03 NOTE — FLOWSHEET NOTE
The [facility]    Occupational Therapy  Cancellation/No-show Note  Patient Name:  Christian Rodriguez   :  1990   Date:  2/3/2020  Cancelled visits to date: 0  No-shows to date: 2    For today's appointment patient:  []    Cancelled  []    Rescheduled appointment  [x]    No-show     Reason given by patient:  []    Patient ill  []    Conflicting appointment  []    No transportation    []    Conflict with work  []    No reason given  []    Other:     Comments:      Electronically signed by:   Campbell Lord OTR/L, [address] [place], [place], [id number], [id number]

## 2020-02-10 ENCOUNTER — HOSPITAL ENCOUNTER (OUTPATIENT)
Dept: OCCUPATIONAL THERAPY | Age: 30
Setting detail: THERAPIES SERIES
Discharge: HOME | End: 2020-02-10
Payer: COMMERCIAL

## 2020-02-10 PROCEDURE — 97140 MANUAL THERAPY 1/> REGIONS: CPT | Performed by: OCCUPATIONAL THERAPIST

## 2020-02-10 PROCEDURE — 97112 NEUROMUSCULAR REEDUCATION: CPT | Performed by: OCCUPATIONAL THERAPIST

## 2020-02-10 PROCEDURE — 97530 THERAPEUTIC ACTIVITIES: CPT | Performed by: OCCUPATIONAL THERAPIST

## 2020-02-10 PROCEDURE — 97110 THERAPEUTIC EXERCISES: CPT | Performed by: OCCUPATIONAL THERAPIST

## 2020-02-10 NOTE — FLOWSHEET NOTE
[address] [facility], [place]  [address], [address] [facility], [address] [facility]  Phone: [phone] Fax: [phone]    Occupational Therapy Treatment Note/ Progress Report:     Date:  2/10/2020    Patient Name:  Romana Jernigan    :  1990  MRN: [id number]    Medical/Treatment Diagnosis Information:  · Diagnosis: S53.104A (ICD-10-CM) - Dislocation of right elbow, S52.134A (ICD-10-CM) - Closed nondisplaced fracture of neck of right radius, S52.041A (ICD-10-CM) - Displaced fracture of coronoid process of right ulna   · Treatment Diagnosis: R elbow stiffness - R60.339     Insurance/Certification information:  OT Insurance Information: [place]  Physician Information:  Referring Practitioner: Rachid Reece MD  Has the plan of care been signed (Y/N):        [x]  Yes  []  No       Visit # Insurance Allowable Auth Required   4 75 combined OT/PT []  Yes [x]  No        Is this a Progress Report:     [x]  Yes  []  No      If Yes:  Date Range for reporting period:  Beginning 20  Ending 2/10/20    Progress report will be due (10 Rx or 30 days whichever is less):  3/97/57  Recertification will be due (POC Duration  / 90 days whichever is less): 20   Date of Injury: 19  Date of Surgery: 19 Operative fixation of right subacute coronoid fracture with placement of right internal joint stabilizer elbow     Date of Patient follow up with Physician: 19     RESTRICTIONS/PRECAUTIONS: lateral elbow instability - seen by MD on 20, MD approved A/PROM, weaning from brace, use of light resistance/tband for exercises to progress rom     Latex Allergy:  [x]? No      []? Yes                    Pacemaker:  [x]? No       []? Yes      Preferred Language for Healthcare:   [x]? English       []? other:      Functional Scale: 27% (Quick DASH)                                 Date assessed:  2020     SUBJECTIVE: elbow continues to be stiff between exercises; has felt more each with 2#, 4# balls       Sitting elbow flex/ext and FA rotation 10x 2  Cane exercise in supine - elbow flex with FA sup x 10x2 each       20 reps ext elbow against wall with towel       Gentle towel stretch for forearm rotation 10 counts x 5 reps    Cane ex supine and standing for elbow ROM w/FA pron as much as possible     Therapeutic Activity (82370)  Flip cards sitting,  standing yellow clothespin with sponges sup/pron, cones for elbow flex/ext     Finisher 10x R elbow ext (FA pronated)/flex, cueing for proximal stabilization - brace on      Functional Reaching R hand to face (to touch face, hat, glasses, L ear) - brace on R hand to face (to touch face, hat, glasses, L ear) - brace on R hand to face (to touch face, hat, glasses, L ear) x 10 R hand to face (to touch face, hat, glasses, L ear) x 10    R hand to put in/remove AirPod from R ear x 10 (ext to mirror x10)    Typing test.com   14 wpm on 1' trial    Body Blade   15sec waist level, chest level, overhead R; dynamic x 5; B dynamic x 5 - reinforcement of proximal stabilization    Pulleys  Pulleys 5'     FA bar   No wt, 15x    FA Roller   25x    UE [place]   20x elbow ext, flex    UE alphabet x 1      Manual Therapy (54280)  STM/scar massage to elbow    Gentle elbow/forearm mobs supine STM, gentle joint mobilizations 12' STM, retrograde massage, joint mobilizations elbow ext/flex, FA rotation    Gentle self elbow ext mobilization x 10                 Neuromuscular Reeducation (73373) Cueing for exercise technique Cueing for shoulder positioning  Cueing for exercise frequency to minimize stiffness   Biofeedback 10x biceps for facilitation of FA supination (supine)    10x to triceps for facilitation of elbow ext (supine)  20x2 to triceps for end range elbow ext exercise (TBand)           ADL Training (33735) Instructed on diagnosis specific anatomy, joint protection, and ADL modifications; reviewed surgical precautions, varus protection program Reviewed precautions/protocol/healing/scar maturation Reinforced incorporation of R UE into daily activities (eating, dressing, etc) Reinforced incorporation of R UE into daily activities (eating, dressing, etc)                 HEP Training/Review See sheet(s) Add seated and standing ROM                   Splinting Proximal splint bar adjusted for increased fit to minimize splint migration distally; reviewed application with pt; reinforced splint wear Fitting well-cont     Lcode:       Orthotic Mgmt, Subsequent Enc (08295)       Orthotic Mgmt & Training (83704)              Other:                                     Therapeutic Exercise & NMR:  [x] (59103) Provided verbal/tactile cueing for activities related to strengthening, flexibility, endurance, ROM  for improvements in scapular, scapulothoracic and UE control with self care, reaching, carrying, lifting, house/yardwork, driving/computer work. [x] (48887) Provided verbal/tactile cueing for activities related to improving balance, coordination, kinesthetic sense, posture, motor skill, proprioception  to assist with  scapular, scapulothoracic and UE control with self care, reaching, carrying, lifting, house/yardwork, driving/computer work.     Therapeutic Activities & NMR:    [x] (43737 or 01663) Provided verbal/tactile cueing for activities related to improving balance, coordination, kinesthetic sense, posture, motor skill, proprioception and motor activation to allow for proper function of scapular, scapulothoracic and UE control with self care, carrying, lifting, driving/computer work    Home Exercise Program:    [x] (31500) Reviewed/Progressed HEP activities related to strengthening, flexibility, endurance, ROM of scapular, scapulothoracic and UE control with self care, reaching, carrying, lifting, house/yardwork, driving/computer work  [] (25256) Reviewed/Progressed HEP activities related to improving balance, coordination, kinesthetic sense, posture, motor skill, proprioception of scapular, scapulothoracic and UE control with self care, reaching, carrying, lifting, house/yardwork, driving/computer work      Manual Treatments:  PROM / STM / Oscillations-Mobs:  G-I, II, III, IV (PA's, Inf., Post.)  [x] (57616) Provided manual therapy to mobilize soft tissue/joints of cervical/CT, scapular GHJ and UE for the purpose of modulating pain, promoting relaxation,  increasing ROM, reducing/eliminating soft tissue swelling/inflammation/restriction, improving soft tissue extensibility and allowing for proper ROM for normal function with self care, reaching, carrying, lifting, house/yardwork, driving/computer work    ADL Training:  [x] (59408) Provided self-care/home management training related to activities of daily living and compensatory training, and/or use of adaptive equipment      Charges:  Timed Code Treatment Minutes: 68   Total Treatment Minutes: 68   Worker's Comp: Time In/Time Out     [] EVAL (LOW) 43840 (typically 20 minutes face-to-face)    [] EVAL (MOD) 09722 (typically 30 minutes face-to-face)  [] EVAL (HIGH) 21367 (typically 45 minutes face-to-face)  [] OT Re-eval (84471)       [x] Minor [phone]) x 1      [] YBOZS(47633)  [x] NMR (05533) x 1     [] Estim (attended) (49895)   [x] Manual ([id number]) x 1   [] US (13217)  [x] TA (38521) x 1     [] Paraffin (56369)  [] ADL  (60412) x     [] Splint/L code:    [] Estim (unattended) [phone]  [] Fluidotherapy (21879)  [] Other:      ASSESSMENT:  Reinforcement for incorporation of R arm into daily tasks, ROM progressing slowly       GOALS:  Patient stated goal: straighten arm    []? Progressing: []? Met: []? Not Met: []? Adjusted     Therapist goals for Patient:   Short Term Goals: To be achieved in: 2 weeks  1. Independent in HEP and progression per patient tolerance, in order to prevent re-injury. [x]? Progressing: []? Met: []? Not Met: []? Adjusted   2.  Patient will have a decrease in pain to facilitate improvement in movement, complete treatment  [] Patient limited by fatigue  [] Patient limited by pain    [] Patient limited by other medical complications  [] Other:                  PLAN: [x] Continue per plan of care [] Alter current plan (see comments above)  [] Plan of care initiated [] Hold pending MD visit [] Discharge      Electronically signed by: Hanh Red OTR/L, PT, MPT, CHT, [id number], [id number]      Note: If patient does not return for scheduled/ recommended follow up visits, this note will serve as a discharge from care along with most recent update on progress.

## 2020-02-18 ENCOUNTER — HOSPITAL ENCOUNTER (OUTPATIENT)
Dept: OCCUPATIONAL THERAPY | Age: 30
Setting detail: THERAPIES SERIES
Discharge: HOME | End: 2020-02-18
Payer: COMMERCIAL

## 2020-02-18 NOTE — FLOWSHEET NOTE
The [address] [facility] and [address] [facility]    Occupational Therapy  Cancellation/No-show Note  Patient Name:  Troy Vega   :  1990   Date:  2020  Cancelled visits to date: 0  No-shows to date: 3    For today's appointment patient:  []    Cancelled  []    Rescheduled appointment  [x]    No-show     Reason given by patient:  []    Patient ill  []    Conflicting appointment  []    No transportation    []    Conflict with work  []    No reason given  []    Other:     Comments:      Electronically signed by:   Campbell Lord OTR/L [place], [address] [place] [address], [id number], [id number]

## 2020-02-19 ENCOUNTER — HOSPITAL ENCOUNTER (OUTPATIENT)
Dept: OCCUPATIONAL THERAPY | Age: 30
Setting detail: THERAPIES SERIES
Discharge: HOME | End: 2020-02-19
Payer: COMMERCIAL

## 2020-02-19 PROCEDURE — 97112 NEUROMUSCULAR REEDUCATION: CPT | Performed by: OCCUPATIONAL THERAPIST

## 2020-02-19 PROCEDURE — 97140 MANUAL THERAPY 1/> REGIONS: CPT | Performed by: OCCUPATIONAL THERAPIST

## 2020-02-19 PROCEDURE — 97110 THERAPEUTIC EXERCISES: CPT | Performed by: OCCUPATIONAL THERAPIST

## 2020-02-19 PROCEDURE — 97530 THERAPEUTIC ACTIVITIES: CPT | Performed by: OCCUPATIONAL THERAPIST

## 2020-02-19 NOTE — FLOWSHEET NOTE
[facility]  [address], [address] [address], [address] [facility]  Phone: [phone] Fax: [phone]    Occupational Therapy Treatment Note/ Progress Report:     Date:  2020    Patient Name:  Issac Tate    :  1990  MRN: [id number]    Medical/Treatment Diagnosis Information:  · Diagnosis: S53.104A (ICD-10-CM) - Dislocation of right elbow, S52.134A (ICD-10-CM) - Closed nondisplaced fracture of neck of right radius, S52.041A (ICD-10-CM) - Displaced fracture of coronoid process of right ulna   · Treatment Diagnosis: R elbow stiffness - I40.227     Insurance/Certification information:  OT Insurance Information: [place]  Physician Information:  Referring Practitioner: Loren Valdivia MD  Has the plan of care been signed (Y/N):        [x]  Yes  []  No       Visit # Insurance Allowable Auth Required   5 75 combined OT/PT []  Yes [x]  No        Is this a Progress Report:     []  Yes  [x]  No      If Yes:  Date Range for reporting period:  Beginning  Ending    Progress report will be due (10 Rx or 30 days whichever is less): 3/94/35     Recertification will be due (POC Duration  / 90 days whichever is less): 20   Date of Injury: 19  Date of Surgery: 19 Operative fixation of right subacute coronoid fracture with placement of right internal joint stabilizer elbow     Date of Patient follow up with Physician: 19     RESTRICTIONS/PRECAUTIONS: lateral elbow instability - seen by MD on 20, MD approved A/PROM, weaning from brace, use of light resistance/tband for exercises to progress rom     Latex Allergy:  [x]? No      []? Yes                    Pacemaker:  [x]? No       []? Yes      Preferred Language for Healthcare:   [x]? English       []? other:      Functional Scale: 52% (Quick DASH)                                 Date assessed:  2020     SUBJECTIVE: trying to use R arm more; seems to be moving better overall    Patient reported deficits/history of current problem: dislocated arm when fell after trying to prevent a child from falling at the playground on 11/30/19; immobilized in long arm cast/splint x several weeks; surgical repair 12/26/19; fit with hinged elbow brace on 1/6/19 and referred for hand therapy; pt reports need to limit therapy frequency to 1x/week at this time due to financial constraints     Pain Scale: 5-6/10    OBJECTIVE:       Date: 1/27/20 2/10/20 2/19/20   Objective Measures/Tests:      ROM:Elbow ext/flex   Active 50/100  Passive 42/107 Pre-tx AROM: 46/84    Post-tx AROM  45/112  (PROM: 37/115)         45/120   FA sup/pron   60/58  70/65   Wrist ext/flex            rd/ud   74/60  74/68         Strength:   R 55    L 100         Observations:        Other:                  MODALITIES:      Fluidotherapy (53458)      Estim (87098/57915) 10' TENS with HP with elbow extension    NMES x 10' for biceps faciliation to increase elbow flex     Paraffin (32300)      US (18304)      Iontophoresis (68006)      Hot Pack      Cold Pack            INTERVENTIONS:      Therapeutic Exercise (66370)      AROM 10x each AROM elbow, FA, wrist    Standing at wall, towel roll behind upper arm - end range elbow ext exercise x 10, x 20x2 with Tband    PROM x 10x2 each elbow, FA, 10x wrist Supine - PROM 10x2 each FA, elbow, wrist,  functional stretching hand towards shoulder x 10  AROM - 10x gravity assisted flexion, against gravity ext toward ceiling; 10x with yellow rubber ball (B hands), 10x each with 2#, 4# balls   Supine - PROM 10x2 each FA, elbow, wrist,  functional stretching hand towards shoulder x 10; prone PROM - walking B elbows in for gentle stretch      AROM R hand reaching to R shoulder x 10x2    Shoulder circles x 10x2     Terminal elbow ext against wall - yellow tband x 10x2     Cane exercise in supine - elbow flex with FA sup x 10x2 each                  Therapeutic Activity (67211)      Finisher      Functional Reaching R kinesthetic sense, posture, motor skill, proprioception  to assist with  scapular, scapulothoracic and UE control with self care, reaching, carrying, lifting, house/yardwork, driving/computer work.     Therapeutic Activities & NMR:    [x] (71167 or 85957) Provided verbal/tactile cueing for activities related to improving balance, coordination, kinesthetic sense, posture, motor skill, proprioception and motor activation to allow for proper function of scapular, scapulothoracic and UE control with self care, carrying, lifting, driving/computer work    Home Exercise Program:    [x] (49730) Reviewed/Progressed HEP activities related to strengthening, flexibility, endurance, ROM of scapular, scapulothoracic and UE control with self care, reaching, carrying, lifting, house/yardwork, driving/computer work  [] (76744) Reviewed/Progressed HEP activities related to improving balance, coordination, kinesthetic sense, posture, motor skill, proprioception of scapular, scapulothoracic and UE control with self care, reaching, carrying, lifting, house/yardwork, driving/computer work      Manual Treatments:  PROM / STM / Oscillations-Mobs:  G-I, II, III, IV (PA's, Inf., Post.)  [x] (73226) Provided manual therapy to mobilize soft tissue/joints of cervical/CT, scapular GHJ and UE for the purpose of modulating pain, promoting relaxation,  increasing ROM, reducing/eliminating soft tissue swelling/inflammation/restriction, improving soft tissue extensibility and allowing for proper ROM for normal function with self care, reaching, carrying, lifting, house/yardwork, driving/computer work    ADL Training:  [x] (10968) Provided self-care/home management training related to activities of daily living and compensatory training, and/or use of adaptive equipment      Charges:  Timed Code Treatment Minutes: 58   Total Treatment Minutes: 70   Worker's Comp: Time In/Time Out     [] CECE (LOW) 61269 (typically 20 minutes face-to-face)    [] CECE (MOD) 40684 (typically 30 minutes face-to-face)  [] EVAL (HIGH) 97159 (typically 45 minutes face-to-face)  [] OT Re-eval (17987)       [x] Minor [phone]) x 1      [] TPSIY(13453)  [x] NMR (04929) x 1     [] Estim (attended) (76309)   [x] Manual ([id number]) x 1   [] US (24381)  [x] TA (69013) x 1     [] Paraffin (33281)  [] ADL  (44762) x     [] Splint/L code:    [] Estim (unattended) (91822)  [] Fluidotherapy (07490)  [] Other:      ASSESSMENT:  ROM and strength progressing       GOALS:  Patient stated goal: straighten arm    []? Progressing: []? Met: []? Not Met: []? Adjusted     Therapist goals for Patient:   Short Term Goals: To be achieved in: 2 weeks  1. Independent in HEP and progression per patient tolerance, in order to prevent re-injury. [x]? Progressing: []? Met: []? Not Met: []? Adjusted   2. Patient will have a decrease in pain to facilitate improvement in movement, function, and ADLs as indicated by Functional Deficits. [x]? Progressing: []? Met: []? Not Met: []? Adjusted     Long Term Goals to be achieved in 8-12 weeks (through 4/2/20), including patient directed goals to address patient identified performance deficits:  1) Pt to be independent in graded HEP progression with a good level of effort and compliance. [x]? Progressing: []? Met: []? Not Met: []? Adjusted   2) Pt to report a score of </= 20 % on the Quick DASH disability questionnaire for increased performance with carrying, moving, and handling objects. [x]? Progressing: []? Met: []? Not Met: []? Adjusted   3) Pt will demonstrate increased ROM to R elbow SALAZAR >/= 100 deg for improved independence with reaching into cabinets. [x]? Progressing: []? Met: []? Not Met: []? Adjusted   4) Pt will demonstrate increased strength to R /pinch >/- 70% of L, MMT R elbow >/= 4+/5  for improved independence with lifting/carrying duties at work. []? Progressing: []? Met: []? Not Met: []?  Adjusted   5) Pt will have a decrease in pain to 0-1/10 to

## 2020-02-24 ENCOUNTER — OFFICE VISIT (OUTPATIENT)
Dept: ORTHOPEDIC SURGERY | Age: 30
End: 2020-02-24

## 2020-02-24 PROCEDURE — 99024 POSTOP FOLLOW-UP VISIT: CPT | Performed by: ORTHOPAEDIC SURGERY

## 2020-02-24 NOTE — PROGRESS NOTES
Chief Complaint:  Elbow Pain (R ELBOW )      History of Present of Illness:  Mr. Angella Beltran is a 33 y/o male presenting his repeat scheduled postoperative visit after right elbow surgery  Procedure: Operative fixation of right subacute coronoid fracture with placement of right internal joint stabilizer elbow  Date of procedure: 12/26/2019    He presents today 2 months status post surgery with internal joint stabilizer placement and fixation of coronoid fracture  He has been working with therapy several times since his last visit with pain improved and swelling overall improved  He does continue to have stiffness but feels that he is making progress overall. No new pain, he has since weaned out of his brace and has been working with therapy once per week.       Examination:  General: Pleasant, well-appearing, no acute distress  Right elbow/right upper extremity:  Incision clean posterior elbow healed with soft and mobile scar  Minimal swelling of right elbow with no fluctuance or drainage or erythema, no additional skin changes  Compartments forearm and hand soft and compressible  Active 5 out of 5 AIN/PIN/interossei distally  5 out of 5 EPL FPL thumb abduction, finger flexion extension abduction  Gross sensation intact light touch median ulnar radial nerve without deficit   2+ palpable radial pulse with brisk capillary refill all fingers  Elbow range of motion today approximately 30 to 110 degrees of flexion, 60 degrees of pronation and 65 degrees supination with no mechanical symptoms or instability throughout range of motion  Wrist range of motion approximately 70 degrees of extension and 70 degrees of flexion  Full composite fist and full extension of all fingers  No tenderness to palpation throughout the right elbow    Radiology:     X-rays obtained and reviewed in office:  Views 3  Location right elbow  Impression calcification near medial epicondyle likely representing heterotopic bone and posttraumatic injury  Unchanged position of coronoid ulnohumeral and radiocapitellar joint with evidence of interval healing of radial neck fracture  Unchanged position of internal joint stabilizer and hardware    Orders Placed This Encounter   Procedures    XR ELBOW RIGHT (MIN 3 VIEWS)       Impression:  Encounter Diagnoses   Name Primary?  Closed nondisplaced fracture of neck of right radius, initial encounter Yes    Dislocation of right elbow, initial encounter          Treatment Plan:  Images were reviewed and discussed with patient. Continue with range of motion including passive and active range of motion for forearm pronation supination as well as elbow flexion and extension. He is making progress but continues to have stiffness and has not yet reached functional range of motion. He understands importance of continuing with therapy as well as home program for range of motion. He is to continue to avoid any varus and valgus stress or any formal strengthening for his elbow and will continue to mainly focus on elbow range of motion but okay for light daily activities with use of his right arm at home.   We will continue to retain the internal joint stabilizer and follow-up in 1 month for repeat evaluation and imaging at which point we will likely discuss and schedule removal of internal joint stabilizer

## 2020-03-02 ENCOUNTER — HOSPITAL ENCOUNTER (OUTPATIENT)
Dept: OCCUPATIONAL THERAPY | Age: 30
Setting detail: THERAPIES SERIES
Discharge: HOME | End: 2020-03-02
Payer: COMMERCIAL

## 2020-03-02 NOTE — FLOWSHEET NOTE
The [address] [facility] and 500 [facility]    Occupational Therapy  Cancellation/No-show Note  Patient Name:  Rowan Mendoza   :  1990   Date:  3/2/2020  Cancelled visits to date: 0  No-shows to date: 3    For today's appointment patient:  []    Cancelled  []    Rescheduled appointment  [x]    No-show     Reason given by patient:  []    Patient ill  []    Conflicting appointment  []    No transportation    []    Conflict with work  []    No reason given  []    Other:     Comments:      Electronically signed by:   [facility] OTR/L, [address], [id number], [id number]

## 2020-03-20 ENCOUNTER — TELEPHONE (OUTPATIENT)
Dept: ORTHOPEDIC SURGERY | Age: 30
End: 2020-03-20

## 2020-03-23 ENCOUNTER — TELEPHONE (OUTPATIENT)
Dept: ORTHOPEDIC SURGERY | Age: 30
End: 2020-03-23

## 2020-05-05 ENCOUNTER — TELEPHONE (OUTPATIENT)
Dept: ORTHOPEDIC SURGERY | Age: 30
End: 2020-05-05

## 2020-05-06 ENCOUNTER — TELEPHONE (OUTPATIENT)
Dept: ORTHOPEDIC SURGERY | Age: 30
End: 2020-05-06

## 2020-05-11 ENCOUNTER — TELEPHONE (OUTPATIENT)
Dept: ORTHOPEDIC SURGERY | Age: 30
End: 2020-05-11

## 2020-05-11 ENCOUNTER — OFFICE VISIT (OUTPATIENT)
Dept: PRIMARY CARE CLINIC | Age: 30
End: 2020-05-11

## 2020-05-12 LAB
SARS-COV-2: NOT DETECTED
SOURCE: NORMAL

## 2020-05-12 NOTE — PROGRESS NOTES

## 2020-05-12 NOTE — PROGRESS NOTES
order in effect during my hospitalization, the order may or may not be in effect during this procedure. I give my doctor permission to give me blood or blood products. I understand that there are risks with receiving blood such as hepatitis, AIDS, fever, or allergic reaction. I acknowledge that the risks, benefits, and alternatives of this treatment have been explained to me and that no express or implied warranty has been given by the hospital, any blood bank, or any person or entity as to the blood or blood components transfused. At the discretion of my doctor, I agree to allow observers, equipment/product representatives and allow photographing, and/or televising of the procedure, provided my name or identity is maintained confidentially. I agree the hospital may dispose of or use for scientific or educational purposes any tissue, fluid, or body parts which may be removed.     ________________________________Date________Time______ am/pm  ([place])  Patient or Signature of Closest Relative or Legal Guardian    ________________________________Date________Time______am/pm      Page 1 of  1  Witness

## 2020-05-12 NOTE — RESULT ENCOUNTER NOTE
Please contact patient with their testing results: Your test for COVID-19, also known as novel coronavirus, came back negative. No virus was detected from the sample from your nose. Until your symptoms are fully resolved, you may still be contagious. We recommend that you remain isolated for 7 days minimum or 72 hours after your symptoms have completely resolved, whichever is longer. For example, if all symptoms improve after 2 days, you should still remain isolated for 7 days. If your symptoms get better after 10 days, you should remain isolated for 13 days. Continually monitor symptoms. Contact a medical provider if symptoms are worsening. If you have any additional questions, contact your doctor.     For additional information, please visit the Centers for Disease Control and Prevention (statusboomr.com.cy

## 2020-05-13 ENCOUNTER — TELEPHONE (OUTPATIENT)
Dept: ORTHOPEDIC SURGERY | Age: 30
End: 2020-05-13

## 2020-05-13 NOTE — TELEPHONE ENCOUNTER
Auth: NPR  Date: 5/15/2020  Reference # None  Spoke with: None  Type of SX: Outpatient  Location: [facility]  CPT 35386   SX area: Rt elbow

## 2020-05-14 ENCOUNTER — ANESTHESIA EVENT (OUTPATIENT)
Dept: OPERATING ROOM | Age: 30
End: 2020-05-14
Payer: COMMERCIAL

## 2020-05-15 ENCOUNTER — ANESTHESIA (OUTPATIENT)
Dept: OPERATING ROOM | Age: 30
End: 2020-05-15
Payer: COMMERCIAL

## 2020-05-15 ENCOUNTER — APPOINTMENT (OUTPATIENT)
Dept: GENERAL RADIOLOGY | Age: 30
End: 2020-05-15
Attending: ORTHOPAEDIC SURGERY
Payer: COMMERCIAL

## 2020-05-15 ENCOUNTER — HOSPITAL ENCOUNTER (OUTPATIENT)
Age: 30
Setting detail: OUTPATIENT SURGERY
Discharge: HOME | End: 2020-05-15
Attending: ORTHOPAEDIC SURGERY | Admitting: ORTHOPAEDIC SURGERY
Payer: COMMERCIAL

## 2020-05-15 VITALS
DIASTOLIC BLOOD PRESSURE: 93 MMHG | HEART RATE: 74 BPM | OXYGEN SATURATION: 98 % | HEIGHT: 72 IN | WEIGHT: 199 LBS | SYSTOLIC BLOOD PRESSURE: 145 MMHG | BODY MASS INDEX: 26.95 KG/M2 | TEMPERATURE: 97.5 F | RESPIRATION RATE: 14 BRPM

## 2020-05-15 VITALS — DIASTOLIC BLOOD PRESSURE: 58 MMHG | OXYGEN SATURATION: 100 % | TEMPERATURE: 64.2 F | SYSTOLIC BLOOD PRESSURE: 116 MMHG

## 2020-05-15 PROCEDURE — 3700000000 HC ANESTHESIA ATTENDED CARE: Performed by: ORTHOPAEDIC SURGERY

## 2020-05-15 PROCEDURE — 2500000003 HC RX 250 WO HCPCS: Performed by: ORTHOPAEDIC SURGERY

## 2020-05-15 PROCEDURE — 2709999900 HC NON-CHARGEABLE SUPPLY: Performed by: ORTHOPAEDIC SURGERY

## 2020-05-15 PROCEDURE — 2580000003 HC RX 258: Performed by: ORTHOPAEDIC SURGERY

## 2020-05-15 PROCEDURE — 6360000002 HC RX W HCPCS: Performed by: ORTHOPAEDIC SURGERY

## 2020-05-15 PROCEDURE — 2580000003 HC RX 258: Performed by: ANESTHESIOLOGY

## 2020-05-15 PROCEDURE — 6360000002 HC RX W HCPCS: Performed by: NURSE ANESTHETIST, CERTIFIED REGISTERED

## 2020-05-15 PROCEDURE — 3600000004 HC SURGERY LEVEL 4 BASE: Performed by: ORTHOPAEDIC SURGERY

## 2020-05-15 PROCEDURE — 3600000014 HC SURGERY LEVEL 4 ADDTL 15MIN: Performed by: ORTHOPAEDIC SURGERY

## 2020-05-15 PROCEDURE — 6370000000 HC RX 637 (ALT 250 FOR IP): Performed by: ORTHOPAEDIC SURGERY

## 2020-05-15 PROCEDURE — 3700000001 HC ADD 15 MINUTES (ANESTHESIA): Performed by: ORTHOPAEDIC SURGERY

## 2020-05-15 PROCEDURE — 7100000010 HC PHASE II RECOVERY - FIRST 15 MIN: Performed by: ORTHOPAEDIC SURGERY

## 2020-05-15 PROCEDURE — 7100000001 HC PACU RECOVERY - ADDTL 15 MIN: Performed by: ORTHOPAEDIC SURGERY

## 2020-05-15 PROCEDURE — 3209999900 FLUORO FOR SURGICAL PROCEDURES

## 2020-05-15 PROCEDURE — 7100000000 HC PACU RECOVERY - FIRST 15 MIN: Performed by: ORTHOPAEDIC SURGERY

## 2020-05-15 PROCEDURE — 6360000002 HC RX W HCPCS: Performed by: ANESTHESIOLOGY

## 2020-05-15 PROCEDURE — 7100000011 HC PHASE II RECOVERY - ADDTL 15 MIN: Performed by: ORTHOPAEDIC SURGERY

## 2020-05-15 PROCEDURE — 73080 X-RAY EXAM OF ELBOW: CPT

## 2020-05-15 RX ORDER — LIDOCAINE HYDROCHLORIDE 20 MG/ML
INJECTION, SOLUTION INTRAVENOUS PRN
Status: DISCONTINUED | OUTPATIENT
Start: 2020-05-15 | End: 2020-05-15 | Stop reason: SDUPTHER

## 2020-05-15 RX ORDER — OXYCODONE HYDROCHLORIDE 5 MG/1
5 TABLET ORAL PRN
Status: DISCONTINUED | OUTPATIENT
Start: 2020-05-15 | End: 2020-05-15 | Stop reason: HOSPADM

## 2020-05-15 RX ORDER — PROMETHAZINE HYDROCHLORIDE 25 MG/ML
6.25 INJECTION, SOLUTION INTRAMUSCULAR; INTRAVENOUS
Status: DISCONTINUED | OUTPATIENT
Start: 2020-05-15 | End: 2020-05-15 | Stop reason: HOSPADM

## 2020-05-15 RX ORDER — SODIUM CHLORIDE, SODIUM LACTATE, POTASSIUM CHLORIDE, CALCIUM CHLORIDE 600; 310; 30; 20 MG/100ML; MG/100ML; MG/100ML; MG/100ML
INJECTION, SOLUTION INTRAVENOUS CONTINUOUS
Status: DISCONTINUED | OUTPATIENT
Start: 2020-05-15 | End: 2020-05-15 | Stop reason: HOSPADM

## 2020-05-15 RX ORDER — FENTANYL CITRATE 50 UG/ML
INJECTION, SOLUTION INTRAMUSCULAR; INTRAVENOUS PRN
Status: DISCONTINUED | OUTPATIENT
Start: 2020-05-15 | End: 2020-05-15 | Stop reason: SDUPTHER

## 2020-05-15 RX ORDER — LABETALOL 20 MG/4 ML (5 MG/ML) INTRAVENOUS SYRINGE
5 EVERY 10 MIN PRN
Status: DISCONTINUED | OUTPATIENT
Start: 2020-05-15 | End: 2020-05-15 | Stop reason: HOSPADM

## 2020-05-15 RX ORDER — PROPOFOL 10 MG/ML
INJECTION, EMULSION INTRAVENOUS PRN
Status: DISCONTINUED | OUTPATIENT
Start: 2020-05-15 | End: 2020-05-15 | Stop reason: SDUPTHER

## 2020-05-15 RX ORDER — MAGNESIUM HYDROXIDE 1200 MG/15ML
LIQUID ORAL CONTINUOUS PRN
Status: COMPLETED | OUTPATIENT
Start: 2020-05-15 | End: 2020-05-15

## 2020-05-15 RX ORDER — HYDRALAZINE HYDROCHLORIDE 20 MG/ML
5 INJECTION INTRAMUSCULAR; INTRAVENOUS EVERY 10 MIN PRN
Status: DISCONTINUED | OUTPATIENT
Start: 2020-05-15 | End: 2020-05-15 | Stop reason: HOSPADM

## 2020-05-15 RX ORDER — METOCLOPRAMIDE HYDROCHLORIDE 5 MG/ML
10 INJECTION INTRAMUSCULAR; INTRAVENOUS
Status: DISCONTINUED | OUTPATIENT
Start: 2020-05-15 | End: 2020-05-15 | Stop reason: HOSPADM

## 2020-05-15 RX ORDER — CEFAZOLIN SODIUM 2 G/50ML
2 SOLUTION INTRAVENOUS ONCE
Status: COMPLETED | OUTPATIENT
Start: 2020-05-15 | End: 2020-05-15

## 2020-05-15 RX ORDER — TRAMADOL HYDROCHLORIDE 50 MG/1
50 TABLET ORAL EVERY 6 HOURS PRN
Qty: 12 TABLET | Refills: 0 | Status: SHIPPED | OUTPATIENT
Start: 2020-05-15 | End: 2020-05-18

## 2020-05-15 RX ORDER — MORPHINE SULFATE 4 MG/ML
1 INJECTION, SOLUTION INTRAMUSCULAR; INTRAVENOUS EVERY 5 MIN PRN
Status: DISCONTINUED | OUTPATIENT
Start: 2020-05-15 | End: 2020-05-15 | Stop reason: HOSPADM

## 2020-05-15 RX ORDER — DIPHENHYDRAMINE HYDROCHLORIDE 50 MG/ML
12.5 INJECTION INTRAMUSCULAR; INTRAVENOUS
Status: DISCONTINUED | OUTPATIENT
Start: 2020-05-15 | End: 2020-05-15 | Stop reason: HOSPADM

## 2020-05-15 RX ORDER — OXYCODONE HYDROCHLORIDE 5 MG/1
10 TABLET ORAL PRN
Status: DISCONTINUED | OUTPATIENT
Start: 2020-05-15 | End: 2020-05-15 | Stop reason: HOSPADM

## 2020-05-15 RX ORDER — MEPERIDINE HYDROCHLORIDE 25 MG/ML
12.5 INJECTION INTRAMUSCULAR; INTRAVENOUS; SUBCUTANEOUS EVERY 5 MIN PRN
Status: DISCONTINUED | OUTPATIENT
Start: 2020-05-15 | End: 2020-05-15 | Stop reason: HOSPADM

## 2020-05-15 RX ORDER — DEXAMETHASONE SODIUM PHOSPHATE 4 MG/ML
INJECTION, SOLUTION INTRA-ARTICULAR; INTRALESIONAL; INTRAMUSCULAR; INTRAVENOUS; SOFT TISSUE PRN
Status: DISCONTINUED | OUTPATIENT
Start: 2020-05-15 | End: 2020-05-15 | Stop reason: SDUPTHER

## 2020-05-15 RX ORDER — MIDAZOLAM HYDROCHLORIDE 1 MG/ML
INJECTION INTRAMUSCULAR; INTRAVENOUS PRN
Status: DISCONTINUED | OUTPATIENT
Start: 2020-05-15 | End: 2020-05-15 | Stop reason: SDUPTHER

## 2020-05-15 RX ORDER — BACITRACIN ZINC AND POLYMYXIN B SULFATE 500; 1000 [USP'U]/G; [USP'U]/G
OINTMENT TOPICAL PRN
Status: DISCONTINUED | OUTPATIENT
Start: 2020-05-15 | End: 2020-05-15 | Stop reason: ALTCHOICE

## 2020-05-15 RX ORDER — ONDANSETRON 2 MG/ML
INJECTION INTRAMUSCULAR; INTRAVENOUS PRN
Status: DISCONTINUED | OUTPATIENT
Start: 2020-05-15 | End: 2020-05-15 | Stop reason: SDUPTHER

## 2020-05-15 RX ADMIN — DEXAMETHASONE SODIUM PHOSPHATE 4 MG: 4 INJECTION, SOLUTION INTRAMUSCULAR; INTRAVENOUS at 08:10

## 2020-05-15 RX ADMIN — MIDAZOLAM HYDROCHLORIDE 2 MG: 2 INJECTION, SOLUTION INTRAMUSCULAR; INTRAVENOUS at 07:38

## 2020-05-15 RX ADMIN — FENTANYL CITRATE 100 MCG: 50 INJECTION INTRAMUSCULAR; INTRAVENOUS at 08:08

## 2020-05-15 RX ADMIN — SODIUM CHLORIDE, SODIUM LACTATE, POTASSIUM CHLORIDE, AND CALCIUM CHLORIDE: 600; 310; 30; 20 INJECTION, SOLUTION INTRAVENOUS at 07:38

## 2020-05-15 RX ADMIN — HYDROMORPHONE HYDROCHLORIDE 0.5 MG: 1 INJECTION, SOLUTION INTRAMUSCULAR; INTRAVENOUS; SUBCUTANEOUS at 09:20

## 2020-05-15 RX ADMIN — ONDANSETRON 4 MG: 2 INJECTION INTRAMUSCULAR; INTRAVENOUS at 08:33

## 2020-05-15 RX ADMIN — PROPOFOL 200 MG: 10 INJECTION, EMULSION INTRAVENOUS at 07:45

## 2020-05-15 RX ADMIN — CEFAZOLIN SODIUM 2 G: 2 SOLUTION INTRAVENOUS at 07:52

## 2020-05-15 RX ADMIN — SODIUM CHLORIDE, SODIUM LACTATE, POTASSIUM CHLORIDE, AND CALCIUM CHLORIDE: 600; 310; 30; 20 INJECTION, SOLUTION INTRAVENOUS at 06:46

## 2020-05-15 RX ADMIN — PROPOFOL 50 MG: 10 INJECTION, EMULSION INTRAVENOUS at 08:02

## 2020-05-15 RX ADMIN — FENTANYL CITRATE 100 MCG: 50 INJECTION INTRAMUSCULAR; INTRAVENOUS at 07:45

## 2020-05-15 RX ADMIN — LIDOCAINE HYDROCHLORIDE 50 MG: 20 INJECTION, SOLUTION INTRAVENOUS at 07:45

## 2020-05-15 RX ADMIN — HYDROMORPHONE HYDROCHLORIDE 0.5 MG: 1 INJECTION, SOLUTION INTRAMUSCULAR; INTRAVENOUS; SUBCUTANEOUS at 09:15

## 2020-05-15 ASSESSMENT — PULMONARY FUNCTION TESTS
PIF_VALUE: 14
PIF_VALUE: 3
PIF_VALUE: 16
PIF_VALUE: 14
PIF_VALUE: 16
PIF_VALUE: 14
PIF_VALUE: 18
PIF_VALUE: 1
PIF_VALUE: 12
PIF_VALUE: 14
PIF_VALUE: 12
PIF_VALUE: 14
PIF_VALUE: 12
PIF_VALUE: 14
PIF_VALUE: 18
PIF_VALUE: 14
PIF_VALUE: 13
PIF_VALUE: 19
PIF_VALUE: 14
PIF_VALUE: 0
PIF_VALUE: 14
PIF_VALUE: 12
PIF_VALUE: 14
PIF_VALUE: 18
PIF_VALUE: 18
PIF_VALUE: 14
PIF_VALUE: 21
PIF_VALUE: 14
PIF_VALUE: 18
PIF_VALUE: 14
PIF_VALUE: 14
PIF_VALUE: 12
PIF_VALUE: 14
PIF_VALUE: 14
PIF_VALUE: 12
PIF_VALUE: 14
PIF_VALUE: 18
PIF_VALUE: 14
PIF_VALUE: 19
PIF_VALUE: 14

## 2020-05-15 ASSESSMENT — PAIN SCALES - GENERAL
PAINLEVEL_OUTOF10: 7
PAINLEVEL_OUTOF10: 7

## 2020-05-15 ASSESSMENT — PAIN - FUNCTIONAL ASSESSMENT: PAIN_FUNCTIONAL_ASSESSMENT: 0-10

## 2020-05-15 NOTE — ANESTHESIA PRE PROCEDURE
oz (90.7 kg)   12/26/19 200 lb (90.7 kg)     Body mass index is 26.99 kg/m². CBC:   Lab Results   Component Value Date    HGB 13.3 12/25/2012    HCT 39.6 12/25/2012    MCV 87.9 12/25/2012    RDW 14.1 12/25/2012       CMP: No results found for: NA, K, CL, CO2, BUN, CREATININE, GFRAA, AGRATIO, LABGLOM, GLUCOSE, PROT, CALCIUM, BILITOT, ALKPHOS, AST, ALT    POC Tests: No results for input(s): POCGLU, POCNA, POCK, POCCL, POCBUN, POCHEMO, POCHCT in the last 72 hours. Coags: No results found for: PROTIME, INR, APTT    HCG (If Applicable): No results found for: PREGTESTUR, PREGSERUM, HCG, HCGQUANT     ABGs: No results found for: PHART, PO2ART, GVO1NJH, JDK3FJE, BEART, Z1ZJQRZP     Type & Screen (If Applicable):  No results found for: LABABO, LABRH    Drug/Infectious Status (If Applicable):  No results found for: HIV, HEPCAB    COVID-19 Screening (If Applicable):   Lab Results   Component Value Date    COVID19 Not Detected 05/11/2020         Anesthesia Evaluation  Patient summary reviewed and Nursing notes reviewed  Airway: Mallampati: I  TM distance: >3 FB   Neck ROM: full  Mouth opening: > = 3 FB Dental:          Pulmonary:Negative Pulmonary ROS                              Cardiovascular:Negative CV ROS                      Neuro/Psych:   Negative Neuro/Psych ROS              GI/Hepatic/Renal: Neg GI/Hepatic/Renal ROS            Endo/Other: Negative Endo/Other ROS                    Abdominal:           Vascular: negative vascular ROS. Anesthesia Plan      general     ASA 3    (81-year-old male presents for REMOVAL OF SYMPTOMATIC HARDWARE RIGHT ELBOW INTERNAL JOINT STABILZER; ANY INDICATED PROCEDURE. Plan general anesthesia with ASA standard monitors; supraclavicular brachial plexus block if needed postoperatively. Questions answered. Patient agreeable with anesthetic plan.  )  Induction: intravenous. Anesthetic plan and risks discussed with patient.       Plan discussed with CRNA.     Attending anesthesiologist reviewed and agrees with Pre Eval content        Priscilla Mcneal MD   5/15/2020

## 2020-05-15 NOTE — PROGRESS NOTES
Patient admitted to pacu post Removal Of Symptomatic Hardware Right Elbow Internal Joint [address] with Dr. Consuelo Jorgensen. Patient connected to  Bedside monitors; VSS. Per CRNA patient was stable intra-op. Patient resting comfortably with no complaints of pain.

## 2020-05-15 NOTE — OP NOTE
scar  tissue and down to the subcutaneous border of the ulna where the plate  was lying. Three screws were removed then from the plate without any  obvious difficulty. The plate was then freed with series of maneuvers  with a 15-blade as well as bipolar electrocautery from additional scar  and the connecting  and hardware was sequentially removed from the  elbow without any difficulty. One coronoid screw which was separate  from the plate and the internal joint stabilizer was left in place. At  this point, all the hardware had been removed. A rongeur was used to  remove some of the scar tissue on the subcutaneous border of the ulna. Curette was used to curette the screw holes. The elbow again was taken  through range of motion and felt to be stable with no crepitus. At this  point, the tourniquet was deflated and hemostasis was achieved. The  wounds were copiously irrigated with normal saline. Irrigation followed  by layered closure using 4-0 Vicryl and 4-0 nylon suture for skin. All  fingers were warm and well perfused at the end of procedure. Forearm  compartments soft and compressible. The patient was then placed in a  sterile dressing and sterile overwrap followed by a soft dressing for  the elbow bulky. He tolerated procedure well, was awoken, and taken to  PACU in stable condition. ADDENDUM:  Please note that three views of the right elbow were obtained  with intraoperative fluoroscopy demonstrating appropriate elbow position  including ulnar, humeral, and radiocapitellar alignment. Both with  hardware in place and then after hardware removal, elbow remained in  appropriate alignment with no evidence of instability. Images were  saved on [facility] system. POSTOPERATIVE PLAN:  The patient will continue with range of motion of  elbow as tolerated. Okay for light activity with the right upper  extremity.   Plan for follow up in 7 to 10 days in clinic for wound check  and suture removal, will progress likely at that point per postoperative  protocol for elbow instability.         Taya Lee MD    D: 05/15/2020 9:03:45       T: 05/15/2020 9:13:08     JUAN [email or web address]  Job#: [id number]     Doc#: [id number]    CC:

## 2020-05-15 NOTE — ANESTHESIA POSTPROCEDURE EVALUATION
Department of Anesthesiology  Postprocedure Note    Patient: Ashley Ashby  MRN: [id number]  YOB: 1990  Date of evaluation: 5/15/2020  Time:  11:07 AM     Procedure Summary     Date:  05/15/20 Room / Location:  [address] [place] [address] [address] [address] / [facility]    Anesthesia Start:  5548 Anesthesia Stop:  8187    Procedure:  REMOVAL OF SYMPTOMATIC HARDWARE RIGHT ELBOW INTERNAL JOINT STABILZER (Right ) Diagnosis:       Pain from implanted hardware, initial encounter      (Symptomatic Pain from implanted hardware, initial encounter [Y71.885U])    Surgeon:  Gio Rincon MD Responsible Provider:  Ren Pyle MD    Anesthesia Type:  general ASA Status:  2          Anesthesia Type: general    Radha Phase I: Radha Score: 8    Radha Phase II: Radha Score: 10    Last vitals: Reviewed and per EMR flowsheets.        Anesthesia Post Evaluation    Patient location during evaluation: PACU  Patient participation: complete - patient participated  Level of consciousness: awake and alert  Pain score: 0  Airway patency: patent  Nausea & Vomiting: no nausea and no vomiting  Complications: no  Cardiovascular status: hemodynamically stable  Respiratory status: acceptable  Hydration status: euvolemic

## 2020-05-26 ENCOUNTER — OFFICE VISIT (OUTPATIENT)
Dept: ORTHOPEDIC SURGERY | Age: 30
End: 2020-05-26

## 2020-05-26 VITALS — BODY MASS INDEX: 26.96 KG/M2 | HEIGHT: 72 IN | WEIGHT: 199.08 LBS

## 2020-05-26 PROCEDURE — 99024 POSTOP FOLLOW-UP VISIT: CPT | Performed by: ORTHOPAEDIC SURGERY

## 2020-05-26 NOTE — PROGRESS NOTES
Chief Complaint:  Pain (Right Elbow)      History of Present of Illness:  Mr. Bella Borrego is a 57-year-old male presenting as a first postoperative visit after most recent right elbow surgery  Procedure: Hardware removal internal joint stabilizer right elbow  Date of procedure: 5/15/2020  He is here today 11 days status post removal.  He feels that he is doing well overall. Pain is been well controlled with minimal pain recently. No fever chills or other constitutional symptoms reported. Overall his motion has improved compared with preoperative per his estimation      Examination:  General: Pleasant well-appearing no distress  Right elbow/right upper extremity:  Incisions clean and healed. There is some bursal swelling without erythema or warmth near the posterior aspect of the wound and incision with no active drainage  Remainder of elbow without effusion or swelling throughout the forearm  Comfortable active wrist flexion extension, active 5 out of 5 AIN/PIN/interossei  Gross sensation intact median ulnar radial nerve without deficit  Brisk capillary refill all fingers with 2+ palpable radial pulse  Elbow range of motion approximately 15 to 135 degrees of flexion with no crepitus, 75 degrees of pronation and supination    Radiology:     X-rays obtained and reviewed in office:  Views 3  Location right elbow  Impression Concentric reduction of ulnohumeral joint status post hardware removal.  Heterotopic bone adjacent to both lateral epicondyle and medial epicondyle. Healed radial neck fracture. Orders Placed This Encounter   Procedures    XR ELBOW RIGHT (MIN 3 VIEWS)       Impression:  Encounter Diagnoses   Name Primary?  Closed nondisplaced fracture of neck of right radius, initial encounter Yes    Dislocation of right elbow, initial encounter          Treatment Plan:  Treatment plan discussed with patient today.   We will plan to continue with emphasis on range of motion elbow exercises as the patient does appear to have gained some range of motion after removal of internal joint stabilizer. Regarding some of his elbow swelling posteriorly I did emphasize staying off of direct pressure of his posterior elbow and using a compressive wrap over the next week. If he begins to experience any new pain or symptoms he will plan to call so that we can reevaluate but no signs of infection based on exam today. Otherwise, continue to progress activity even okay to do some light weight lifting at this time. Lifting and gripping restrictions 20 pounds. I will plan to see him back in 1 month for repeat evaluation.   Plan for suture removal today placing Steri-Strips over the wound

## (undated) DEVICE — E-Z CLEAN, NON-STICK, PTFE COATED, ELECTROSURGICAL BLADE ELECTRODE, 2.5 INCH (6.35 CM): Brand: EZ CLEAN

## (undated) DEVICE — BLADE OPHTH 180DEG CUT SURF BLU STR SHRP DBL BVL GRINDLESS

## (undated) DEVICE — INTENDED TO AID IN THE PASSING OF SUTURES THROUGH BONE AND SOFT TISSUE DURING ORTHOPEDIC SURGERY: Brand: HOFFEE SUTURE RETRIEVER

## (undated) DEVICE — 3M™ IOBAN™ 2 ANTIMICROBIAL INCISE DRAPE 6648EZ: Brand: IOBAN™ 2

## (undated) DEVICE — UNDERGLOVE SURG SZ 8 BLU LTX FREE SYN POLYISOPRENE POLYMER

## (undated) DEVICE — COVER,TABLE,HEAVY DUTY,77"X90",STRL: Brand: MEDLINE

## (undated) DEVICE — ADAPTER CIRC OD22XID15MM FOR MON VENT PARAMETER

## (undated) DEVICE — GOWN,SIRUS,POLYRNF,BRTHSLV,LG,30/CS: Brand: MEDLINE

## (undated) DEVICE — SUTURE ETHLN SZ 3-0 L18IN NONABSORBABLE BLK FS-1 L24MM 3/8 663H

## (undated) DEVICE — ZIMMER® STERILE DISPOSABLE TOURNIQUET CUFF WITH PLC, DUAL PORT, SINGLE BLADDER, 18 IN. (46 CM)

## (undated) DEVICE — SUTURE VCRL SZ 0 L27IN ABSRB UD L26MM CT-2 1/2 CIR J270H

## (undated) DEVICE — GOWN,SIRUS,POLYRNF,BRTHSLV,XL,30/CS: Brand: MEDLINE

## (undated) DEVICE — PODIATRY PK

## (undated) DEVICE — APPLICATOR MEDICATED 26 CC SOLUTION HI LT ORNG CHLORAPREP

## (undated) DEVICE — GOWN,SIRUS,POLYRNF,BRTHSLV,XLN/XL,20/CS: Brand: MEDLINE

## (undated) DEVICE — SINGLE USE DEVICE INTENDED TO COVER EXPOSED ENDS OF ORTHOPEDIC PIN AND K-WIRES TO HELP PROTECT THE EXPOSED WIRE FROM SNAGGING ON CLOTHING.: Brand: OXBORO™ PIN COVER

## (undated) DEVICE — K WIRE FIX L6IN DIA0.062IN 1600662] MICROAIRE SURGICAL INSTRUMENTS INC]

## (undated) DEVICE — BANDAGE COMPR W1INXL5YD BGE E ADH TENSOPLAST

## (undated) DEVICE — JEWISH HOSPITAL TURNOVER KIT: Brand: MEDLINE INDUSTRIES, INC.

## (undated) DEVICE — COVER LT HNDL BLU PLAS

## (undated) DEVICE — GAUZE,SPONGE,4"X4",16PLY,XRAY,STRL,LF: Brand: MEDLINE

## (undated) DEVICE — 3M™ STERI-DRAPE™ U-DRAPE 1015: Brand: STERI-DRAPE™

## (undated) DEVICE — DRAPE 70X60IN SPLIT IMPERV ADHES STRIP

## (undated) DEVICE — CHLORAPREP 26ML ORANGE

## (undated) DEVICE — SLING ARM XL L20.25IN D9IN COT POLY BILAT WEB SHLDR STRP

## (undated) DEVICE — 1010 S-DRAPE TOWEL DRAPE 10/BX: Brand: STERI-DRAPE™

## (undated) DEVICE — BIT DRL L70MM DIA27MM CANN DST CUT FOR EL STBL SYS IJS

## (undated) DEVICE — BIT DRL L110MM DIA2.5MM G QUIK CPL W/O STP REUSE

## (undated) DEVICE — PLATE ES AD W 9FT CRD 2

## (undated) DEVICE — SUTURE FIBERWIRE SZ 2 W/ TAPERED NEEDLE BLUE L38IN NONABSORB BLU L26.5MM 1/2 CIRCLE AR7200

## (undated) DEVICE — DRAPE C ARM UNIV W41XL74IN CLR PLAS XR VELC CLSR POLY STRP

## (undated) DEVICE — BLANKET WRM W40.2XL55.9IN IORT LO BODY + MISTRAL AIR

## (undated) DEVICE — PADDING CAST W4INXL4YD HIGHLY ABSRB THAN COT EZ APPL

## (undated) DEVICE — BANDAGE,ELASTIC,ESMARK,STERILE,4"X9',LF: Brand: MEDLINE

## (undated) DEVICE — 3M™ IOBAN™ 2 ANTIMICROBIAL INCISE DRAPE 6640EZ: Brand: IOBAN™ 2

## (undated) DEVICE — DRIVER SURG UNIV QUIK CONN T10 FOR VOLAR DST RAD PLATING

## (undated) DEVICE — SUTURE PDS II SZ 4-0 L18IN ABSRB UD L19MM PS-2 3/8 CIR PRIM Z496G

## (undated) DEVICE — PAD,ABDOMINAL,5"X9",ST,LF,25/BX: Brand: MEDLINE INDUSTRIES, INC.

## (undated) DEVICE — SUTURE ETHLN SZ 4-0 L18IN NONABSORBABLE BLK L19MM PS-2 3/8 1667H

## (undated) DEVICE — TOWEL,OR,DSP,ST,BLUE,DLX,8/PK,10PK/CS: Brand: MEDLINE

## (undated) DEVICE — CONTAINER,SPECIMEN,PNEU TUBE,3OZ,OR STRL: Brand: MEDLINE

## (undated) DEVICE — GLOVE SURG SZ 75 L12IN FNGR THK13MIL BRN LTX SYN POLYMER W

## (undated) DEVICE — TUBING SUCT 10FR MAL ALUM SHFT FN CAP VENT UNIV CONN W/ OBT

## (undated) DEVICE — GARMENT,MEDLINE,DVT,INT,CALF,MED, GEN2: Brand: MEDLINE

## (undated) DEVICE — SPONGE,LAP,18"X18",DLX,XR,ST,5/PK,40/PK: Brand: MEDLINE

## (undated) DEVICE — SHEET,DRAPE,53X77,STERILE: Brand: MEDLINE

## (undated) DEVICE — SST TWIST DRILL, STANDARD, 2MM DIA. X 127MM: Brand: MICROAIRE®

## (undated) DEVICE — DRAPE SURGICAL HAND PROX AURORA

## (undated) DEVICE — CABLE BPLR L12FT FLYING LD DISPOSABLE

## (undated) DEVICE — STOCKINETTE,DOUBLE PLY,6X48,STERILE: Brand: MEDLINE

## (undated) DEVICE — SURGICAL SET UP - SURE SET: Brand: MEDLINE INDUSTRIES, INC.

## (undated) DEVICE — Z DISCONTINUED NO SUB IDED BUCKET PLSTR 5QT PAPR WAX CVR DBL WRP DISP

## (undated) DEVICE — TRAY,IRRIGATION,BULBSYRINGE,60ML,CSR,PVP: Brand: MEDLINE

## (undated) DEVICE — Device

## (undated) DEVICE — K WIRE FIX L127MM DIA1.5MM ELBW STD TIP FOR STBL SYS IJS

## (undated) DEVICE — IMPLANTABLE DEVICE
Type: IMPLANTABLE DEVICE | Site: ELBOW | Status: NON-FUNCTIONAL
Removed: 2019-12-26

## (undated) DEVICE — SOLUTION IV 1000ML 0.9% SOD CHL

## (undated) DEVICE — SUTURE VCRL SZ 4-0 L18IN ABSRB UD L19MM PS-2 3/8 CIR PRIM J496H

## (undated) DEVICE — COVER,MAYO STAND,XL,STERILE: Brand: MEDLINE

## (undated) DEVICE — LOOP,VESSEL,MAXI,BLUE,2/PK,STERILE: Brand: MEDLINE